# Patient Record
Sex: FEMALE | Race: WHITE | NOT HISPANIC OR LATINO | Employment: PART TIME | URBAN - METROPOLITAN AREA
[De-identification: names, ages, dates, MRNs, and addresses within clinical notes are randomized per-mention and may not be internally consistent; named-entity substitution may affect disease eponyms.]

---

## 2021-05-31 ENCOUNTER — RECORDS - HEALTHEAST (OUTPATIENT)
Dept: ADMINISTRATIVE | Facility: CLINIC | Age: 31
End: 2021-05-31

## 2022-02-07 ENCOUNTER — OFFICE VISIT (OUTPATIENT)
Dept: URGENT CARE | Facility: URGENT CARE | Age: 32
End: 2022-02-07

## 2022-02-07 VITALS
TEMPERATURE: 99 F | HEART RATE: 81 BPM | SYSTOLIC BLOOD PRESSURE: 137 MMHG | OXYGEN SATURATION: 99 % | DIASTOLIC BLOOD PRESSURE: 69 MMHG

## 2022-02-07 DIAGNOSIS — N30.00 ACUTE CYSTITIS WITHOUT HEMATURIA: Primary | ICD-10-CM

## 2022-02-07 DIAGNOSIS — N89.8 VAGINAL ITCHING: ICD-10-CM

## 2022-02-07 LAB
ALBUMIN UR-MCNC: NEGATIVE MG/DL
AMORPH CRY #/AREA URNS HPF: ABNORMAL /HPF
APPEARANCE UR: ABNORMAL
BACTERIA #/AREA URNS HPF: ABNORMAL /HPF
BILIRUB UR QL STRIP: NEGATIVE
CLUE CELLS: ABNORMAL
COLOR UR AUTO: YELLOW
GLUCOSE UR STRIP-MCNC: NEGATIVE MG/DL
HCG UR QL: NEGATIVE
HGB UR QL STRIP: NEGATIVE
KETONES UR STRIP-MCNC: NEGATIVE MG/DL
LEUKOCYTE ESTERASE UR QL STRIP: ABNORMAL
NITRATE UR QL: NEGATIVE
PH UR STRIP: 7 [PH] (ref 5–7)
RBC #/AREA URNS AUTO: ABNORMAL /HPF
SP GR UR STRIP: 1.02 (ref 1–1.03)
SQUAMOUS #/AREA URNS AUTO: ABNORMAL /LPF
TRICHOMONAS, WET PREP: ABNORMAL
UROBILINOGEN UR STRIP-ACNC: 0.2 E.U./DL
WBC #/AREA URNS AUTO: ABNORMAL /HPF
WBC'S/HIGH POWER FIELD, WET PREP: ABNORMAL
YEAST, WET PREP: ABNORMAL

## 2022-02-07 PROCEDURE — 87086 URINE CULTURE/COLONY COUNT: CPT | Performed by: NURSE PRACTITIONER

## 2022-02-07 PROCEDURE — 87491 CHLMYD TRACH DNA AMP PROBE: CPT | Performed by: NURSE PRACTITIONER

## 2022-02-07 PROCEDURE — 81001 URINALYSIS AUTO W/SCOPE: CPT | Performed by: NURSE PRACTITIONER

## 2022-02-07 PROCEDURE — 99203 OFFICE O/P NEW LOW 30 MIN: CPT | Performed by: NURSE PRACTITIONER

## 2022-02-07 PROCEDURE — 81025 URINE PREGNANCY TEST: CPT | Performed by: NURSE PRACTITIONER

## 2022-02-07 PROCEDURE — 87210 SMEAR WET MOUNT SALINE/INK: CPT | Performed by: NURSE PRACTITIONER

## 2022-02-07 PROCEDURE — 87591 N.GONORRHOEAE DNA AMP PROB: CPT | Performed by: NURSE PRACTITIONER

## 2022-02-07 RX ORDER — NITROFURANTOIN 25; 75 MG/1; MG/1
100 CAPSULE ORAL 2 TIMES DAILY
Qty: 10 CAPSULE | Refills: 0 | Status: SHIPPED | OUTPATIENT
Start: 2022-02-07 | End: 2022-02-12

## 2022-02-07 NOTE — PROGRESS NOTES
SUBJECTIVE:   Libra Guillen is a 31 year old female who  presents today for a possible UTI. Symptoms of dysuria, urgency and frequency have been going on for 1day(s).  Hematuria no.  sudden onsetand mild.  There is no history of fever, chills, nausea or vomiting.  No history of vaginal or penile discharge. This patient does not have a history of recent urinary tract infections. Patient denies long duration, rigors, flank pain, temperature > 101 degrees F. and Vomiting, significant nausea or diarrhea or vaginal discharge, vaginal odor and dyspareunia (pain in labia/pelvis)   Some itching and redness where she shaved    No past medical history on file.  Current Outpatient Medications   Medication Sig Dispense Refill     levonorgestrel (KYLEENA) 19.5 MG IUD 1 Device by Intrauterine route       Social History     Tobacco Use     Smoking status: Not on file     Smokeless tobacco: Not on file   Substance Use Topics     Alcohol use: Not on file       ROS:   Review of systems negative except as stated above.    OBJECTIVE:  /69   Pulse 81   Temp 99  F (37.2  C)   LMP  (LMP Unknown)   SpO2 99%   GENERAL APPEARANCE: alert and no distress  RESP: lungs clear to auscultation - no rales, rhonchi or wheezes  CV: regular rates and rhythm, normal S1 S2, no murmur noted  ABDOMEN:  soft, nontender, no HSM or masses and bowel sounds normal  BACK: No CVA tenderness  SKIN: no suspicious lesions or rashes  : Normal vagina, redness from shaving and small follicular lesion that is tender     ASSESSMENT:   Lower, uncomplicated urinary tract infection.    PLAN:  Wet prep negative  HCG negative  UA shows uti  STD testing pending  Drink plenty of fluids.  Prevention and treatment of UTI's discussed.Signs and symptoms of pyelonephritis mentioned.  Follow up with primary care physician if not improving      Sharron Smalls, RAISA CNP

## 2022-02-08 ENCOUNTER — TELEPHONE (OUTPATIENT)
Dept: URGENT CARE | Facility: URGENT CARE | Age: 32
End: 2022-02-08

## 2022-02-08 DIAGNOSIS — A74.9 CHLAMYDIA: Primary | ICD-10-CM

## 2022-02-08 LAB
BACTERIA UR CULT: NORMAL
C TRACH DNA SPEC QL NAA+PROBE: POSITIVE
N GONORRHOEA DNA SPEC QL NAA+PROBE: NEGATIVE

## 2022-02-08 RX ORDER — DOXYCYCLINE HYCLATE 100 MG
100 TABLET ORAL 2 TIMES DAILY
Qty: 14 TABLET | Refills: 0 | Status: SHIPPED | OUTPATIENT
Start: 2022-02-08 | End: 2022-02-15

## 2022-02-08 NOTE — TELEPHONE ENCOUNTER
Notified patient of chlamydia results. Rx sent to pharmacy for doxycycline twice daily for 7 days. Advised to have partners tested and treated, consistent condom use, and no intercourse for 3 weeks and until partner(s) treated. Questions answered. Please notify [facility].

## 2022-02-09 ENCOUNTER — TELEPHONE (OUTPATIENT)
Dept: URGENT CARE | Facility: URGENT CARE | Age: 32
End: 2022-02-09

## 2022-02-09 NOTE — TELEPHONE ENCOUNTER
Left message for patient to call regarding lab results from 2/7/22.  Upon further review, it looks like this was already addressed by Rosanne Watts NP yesterday.  She may disregard this message.    Graciela Gallagher PA-C

## 2023-05-04 ENCOUNTER — APPOINTMENT (OUTPATIENT)
Dept: GENERAL RADIOLOGY | Facility: CLINIC | Age: 33
End: 2023-05-04
Attending: EMERGENCY MEDICINE
Payer: COMMERCIAL

## 2023-05-04 ENCOUNTER — HOSPITAL ENCOUNTER (EMERGENCY)
Facility: CLINIC | Age: 33
Discharge: HOME | End: 2023-05-04
Attending: EMERGENCY MEDICINE | Admitting: EMERGENCY MEDICINE
Payer: COMMERCIAL

## 2023-05-04 VITALS
SYSTOLIC BLOOD PRESSURE: 112 MMHG | HEART RATE: 75 BPM | TEMPERATURE: 98 F | DIASTOLIC BLOOD PRESSURE: 97 MMHG | RESPIRATION RATE: 18 BRPM | OXYGEN SATURATION: 97 %
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
## 2023-05-04 DIAGNOSIS — S42.025K CLOSED NONDISPLACED FRACTURE OF SHAFT OF LEFT CLAVICLE WITH NONUNION, SUBSEQUENT ENCOUNTER: ICD-10-CM

## 2023-05-04 DIAGNOSIS — R42 DIZZINESS: ICD-10-CM

## 2023-05-04 DIAGNOSIS — I49.1 PREMATURE ATRIAL CONTRACTIONS: ICD-10-CM

## 2023-05-04 LAB
ALBUMIN SERPL BCG-MCNC: 4.5 G/DL (ref 3.5–5.2)
ALBUMIN UR-MCNC: NEGATIVE MG/DL
ALP SERPL-CCNC: 54 U/L (ref 35–104)
ALT SERPL W P-5'-P-CCNC: 13 U/L (ref 10–35)
ANION GAP SERPL CALCULATED.3IONS-SCNC: 9 MMOL/L (ref 7–15)
APPEARANCE UR: CLEAR
AST SERPL W P-5'-P-CCNC: 16 U/L (ref 10–35)
BACTERIA #/AREA URNS HPF: ABNORMAL /HPF
BASOPHILS # BLD AUTO: 0.1 10E3/UL (ref 0–0.2)
BASOPHILS NFR BLD AUTO: 1 %
BILIRUB SERPL-MCNC: 0.4 MG/DL
BILIRUB UR QL STRIP: NEGATIVE
BUN SERPL-MCNC: 13 MG/DL (ref 6–20)
CALCIUM SERPL-MCNC: 9.6 MG/DL (ref 8.6–10)
CHLORIDE SERPL-SCNC: 103 MMOL/L (ref 98–107)
COLOR UR AUTO: YELLOW
CREAT SERPL-MCNC: 0.75 MG/DL (ref 0.51–0.95)
DEPRECATED HCO3 PLAS-SCNC: 24 MMOL/L (ref 22–29)
EOSINOPHIL # BLD AUTO: 0.1 10E3/UL (ref 0–0.7)
EOSINOPHIL NFR BLD AUTO: 1 %
ERYTHROCYTE [DISTWIDTH] IN BLOOD BY AUTOMATED COUNT: 13.2 % (ref 10–15)
GFR SERPL CREATININE-BSD FRML MDRD: >90 ML/MIN/1.73M2
GLUCOSE SERPL-MCNC: 96 MG/DL (ref 70–99)
GLUCOSE UR STRIP-MCNC: NEGATIVE MG/DL
HCG UR QL: NEGATIVE
HCT VFR BLD AUTO: 42.6 % (ref 35–47)
HGB BLD-MCNC: 14.3 G/DL (ref 11.7–15.7)
HGB UR QL STRIP: NEGATIVE
IMM GRANULOCYTES # BLD: 0 10E3/UL
IMM GRANULOCYTES NFR BLD: 0 %
KETONES UR STRIP-MCNC: NEGATIVE MG/DL
LEUKOCYTE ESTERASE UR QL STRIP: NEGATIVE
LYMPHOCYTES # BLD AUTO: 3.5 10E3/UL (ref 0.8–5.3)
LYMPHOCYTES NFR BLD AUTO: 33 %
MCH RBC QN AUTO: 29.1 PG (ref 26.5–33)
MCHC RBC AUTO-ENTMCNC: 33.6 G/DL (ref 31.5–36.5)
MCV RBC AUTO: 87 FL (ref 78–100)
MONOCYTES # BLD AUTO: 0.7 10E3/UL (ref 0–1.3)
MONOCYTES NFR BLD AUTO: 6 %
MUCOUS THREADS #/AREA URNS LPF: PRESENT /LPF
NEUTROPHILS # BLD AUTO: 6.2 10E3/UL (ref 1.6–8.3)
NEUTROPHILS NFR BLD AUTO: 59 %
NITRATE UR QL: NEGATIVE
NRBC # BLD AUTO: 0 10E3/UL
NRBC BLD AUTO-RTO: 0 /100
PH UR STRIP: 5 [PH] (ref 5–7)
PLATELET # BLD AUTO: 417 10E3/UL (ref 150–450)
POTASSIUM SERPL-SCNC: 4.4 MMOL/L (ref 3.4–5.3)
PROT SERPL-MCNC: 7.4 G/DL (ref 6.4–8.3)
RBC # BLD AUTO: 4.92 10E6/UL (ref 3.8–5.2)
RBC URINE: 0 /HPF
SODIUM SERPL-SCNC: 136 MMOL/L (ref 136–145)
SP GR UR STRIP: 1.02 (ref 1–1.03)
SQUAMOUS EPITHELIAL: 5 /HPF
UROBILINOGEN UR STRIP-MCNC: NORMAL MG/DL
WBC # BLD AUTO: 10.5 10E3/UL (ref 4–11)
WBC URINE: 0 /HPF

## 2023-05-04 PROCEDURE — 99284 EMERGENCY DEPT VISIT MOD MDM: CPT | Mod: 25 | Performed by: EMERGENCY MEDICINE

## 2023-05-04 PROCEDURE — 99285 EMERGENCY DEPT VISIT HI MDM: CPT | Mod: 25 | Performed by: EMERGENCY MEDICINE

## 2023-05-04 PROCEDURE — 85025 COMPLETE CBC W/AUTO DIFF WBC: CPT | Performed by: EMERGENCY MEDICINE

## 2023-05-04 PROCEDURE — 36415 COLL VENOUS BLD VENIPUNCTURE: CPT | Performed by: EMERGENCY MEDICINE

## 2023-05-04 PROCEDURE — 250N000013 HC RX MED GY IP 250 OP 250 PS 637: Performed by: EMERGENCY MEDICINE

## 2023-05-04 PROCEDURE — 81025 URINE PREGNANCY TEST: CPT | Performed by: EMERGENCY MEDICINE

## 2023-05-04 PROCEDURE — 250N000011 HC RX IP 250 OP 636: Performed by: EMERGENCY MEDICINE

## 2023-05-04 PROCEDURE — 80053 COMPREHEN METABOLIC PANEL: CPT | Performed by: EMERGENCY MEDICINE

## 2023-05-04 PROCEDURE — 93005 ELECTROCARDIOGRAM TRACING: CPT | Performed by: EMERGENCY MEDICINE

## 2023-05-04 PROCEDURE — 73030 X-RAY EXAM OF SHOULDER: CPT | Mod: LT

## 2023-05-04 PROCEDURE — 93010 ELECTROCARDIOGRAM REPORT: CPT | Performed by: EMERGENCY MEDICINE

## 2023-05-04 PROCEDURE — 258N000003 HC RX IP 258 OP 636: Performed by: EMERGENCY MEDICINE

## 2023-05-04 PROCEDURE — 96361 HYDRATE IV INFUSION ADD-ON: CPT | Performed by: EMERGENCY MEDICINE

## 2023-05-04 PROCEDURE — 81001 URINALYSIS AUTO W/SCOPE: CPT | Performed by: EMERGENCY MEDICINE

## 2023-05-04 PROCEDURE — 96374 THER/PROPH/DIAG INJ IV PUSH: CPT | Performed by: EMERGENCY MEDICINE

## 2023-05-04 RX ORDER — HYDROCODONE BITARTRATE AND ACETAMINOPHEN 5; 325 MG/1; MG/1
1 TABLET ORAL ONCE
Status: COMPLETED | OUTPATIENT
Start: 2023-05-04 | End: 2023-05-04

## 2023-05-04 RX ORDER — ONDANSETRON 2 MG/ML
4 INJECTION INTRAMUSCULAR; INTRAVENOUS EVERY 30 MIN PRN
Status: DISCONTINUED | OUTPATIENT
Start: 2023-05-04 | End: 2023-05-04 | Stop reason: HOSPADM

## 2023-05-04 RX ORDER — ONDANSETRON 4 MG/1
4 TABLET, ORALLY DISINTEGRATING ORAL EVERY 6 HOURS PRN
Qty: 10 TABLET | Refills: 0 | Status: SHIPPED | OUTPATIENT
Start: 2023-05-04 | End: 2023-05-07

## 2023-05-04 RX ORDER — HYDROCODONE BITARTRATE AND ACETAMINOPHEN 5; 325 MG/1; MG/1
1 TABLET ORAL EVERY 6 HOURS PRN
Qty: 6 TABLET | Refills: 0 | Status: SHIPPED | OUTPATIENT
Start: 2023-05-04

## 2023-05-04 RX ORDER — SODIUM CHLORIDE 9 MG/ML
INJECTION, SOLUTION INTRAVENOUS CONTINUOUS
Status: DISCONTINUED | OUTPATIENT
Start: 2023-05-04 | End: 2023-05-04 | Stop reason: HOSPADM

## 2023-05-04 RX ADMIN — HYDROCODONE BITARTRATE AND ACETAMINOPHEN 1 TABLET: 5; 325 TABLET ORAL at 16:07

## 2023-05-04 RX ADMIN — SODIUM CHLORIDE 1000 ML: 9 INJECTION, SOLUTION INTRAVENOUS at 16:04

## 2023-05-04 RX ADMIN — ONDANSETRON 4 MG: 2 INJECTION INTRAMUSCULAR; INTRAVENOUS at 16:07

## 2023-05-04 ASSESSMENT — ACTIVITIES OF DAILY LIVING (ADL): ADLS_ACUITY_SCORE: 35

## 2023-05-04 NOTE — DISCHARGE INSTRUCTIONS
Your EKG showed extra beats occasionally, but no sign of strain or stress on your heart.  With otherwise normal blood work, suspect that these extra beats may be causing some of your dizziness and symptoms.  An order for a wearable cardiac monitor has been placed, and you can have this put on in the cardiology department here at [facility].  Contact their department for scheduling    You have evidence of an old clavicle fracture that has not healed, especially since they had to remove the plates and screws.  However, does not appear to be displaced, no new injury is appreciated, and no evidence of puncturing lung or skin is appreciated.  Your shoulder was otherwise normal    You may wear the sling that you have at home for comfort for the next 1 to 2 days to support your clavicle and shoulder.  However, you can remove as needed for bathing/showering or sleeping if needed    May take 1 tablet of the Norco every 6 hours as needed for severe pain.  Use caution as this medication can make you drowsy, so do not drive or drink alcohol if taking this medicine    You may use the Zofran every 6 hours as needed for nausea or vomiting    A referral was made to help you get established with a primary care provider.  Somebody should be contacting you to establish care with a clinic provider, and this person can follow-up on any ongoing needs to have, follow-up on cardiac monitor, and any additional health concerns    If you develop any new or worsening symptoms, do not hesitate to return to the emergency room for evaluation

## 2023-05-04 NOTE — ED PROVIDER NOTES
History     Chief Complaint   Patient presents with     Shoulder Pain     HPI  Libra Guillen is a 33 year old female who presents to the emergency room with concern of nausea, dizziness, and fall.  She has been feeling dizzy for the last few days and quite nauseated.  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  She was at the bottom of a set of stairs when she was extremely dizzy and fell over.  Fell onto her left shoulder and collarbone.  Has a history of left collarbone fracture that required plates and screws, then hardware became infected and needed to be removed and needed to be hospitalized.  This occurred 2 years ago.  She fell in the same spot that she had injured before and is having pain.  Is not having any numbness or tingling down her fingers.  Did not hit her head in this fall.    Allergies:  Allergies   Allergen Reactions     Penicillins Rash     Amoxicillin Unknown     Penicillins Unknown     Latex Rash     In bandaid form causes rash if left in place over 24hrs. Pt does not think this is adhesive related but has not been tested.       Problem List:    There are no problems to display for this patient.       Past Medical History:    History reviewed. No pertinent past medical history.    Past Surgical History:    History reviewed. No pertinent surgical history.    Family History:    No family history on file.    Social History:  Marital Status:   [4]  Social History     Tobacco Use     Smoking status: Never     Smokeless tobacco: Never   Substance Use Topics     Alcohol use: Yes     Drug use: Never        Medications:    levonorgestrel (KYLEENA) 19.5 MG IUD          Review of Systems   All other systems reviewed and are negative.      Physical Exam   BP: 133/88  Pulse: 73  Temp: 98  F (36.7  C)  Resp: 18  SpO2: 99 %      Physical Exam  Vitals and nursing note reviewed.   Constitutional:        Appearance: She is not diaphoretic.   HENT:      Head: Normocephalic and atraumatic.      Right Ear: Tympanic membrane normal.      Left Ear: Tympanic membrane normal.   Eyes:      Extraocular Movements: Extraocular movements intact.      Pupils: Pupils are equal, round, and reactive to light.   Cardiovascular:      Rate and Rhythm: Normal rate and regular rhythm.      Pulses: Normal pulses.   Pulmonary:      Effort: Pulmonary effort is normal.   Musculoskeletal:      Cervical back: Normal range of motion.      Comments: Tenderness over mid and distal left clavicular shaft, no obvious deformity or apparent fracture, no skin tenting, no overlying redness or bruising.  Scar from previous surgery is well-healed   Skin:     General: Skin is warm and dry.      Findings: No bruising.   Neurological:      Mental Status: She is alert.         ED Course                 Procedures              EKG Interpretation:      Interpreted by Patsy Solomon DO  Time reviewed: 1600  Symptoms at time of EKG: Dizziness, nausea, left shoulder pain, fall  Rhythm: normal sinus   Rate: Normal  Axis: Normal  Ectopy: premature atrial contraction  Conduction: normal  ST Segments/ T Waves: No acute ischemic changes  Q Waves: none  Comparison to prior: No old EKG available    Clinical Impression: no acute changes and non-specific EKG                Critical Care time:  none               Results for orders placed or performed during the hospital encounter of 05/04/23 (from the past 24 hour(s))   CBC with platelets differential    Narrative    The following orders were created for panel order CBC with platelets differential.  Procedure                               Abnormality         Status                     ---------                               -----------         ------                     CBC with platelets and d...[[id number]]                      Final result                 Please view results for these tests on the individual  orders.   Comprehensive metabolic panel   Result Value Ref Range    Sodium 136 136 - 145 mmol/L    Potassium 4.4 3.4 - 5.3 mmol/L    Chloride 103 98 - 107 mmol/L    Carbon Dioxide (CO2) 24 22 - 29 mmol/L    Anion Gap 9 7 - 15 mmol/L    Urea Nitrogen 13.0 6.0 - 20.0 mg/dL    Creatinine 0.75 0.51 - 0.95 mg/dL    Calcium 9.6 8.6 - 10.0 mg/dL    Glucose 96 70 - 99 mg/dL    Alkaline Phosphatase 54 35 - 104 U/L    AST 16 10 - 35 U/L    ALT 13 10 - 35 U/L    Protein Total 7.4 6.4 - 8.3 g/dL    Albumin 4.5 3.5 - 5.2 g/dL    Bilirubin Total 0.4 <=1.2 mg/dL    GFR Estimate >90 >60 mL/min/1.73m2   CBC with platelets and differential   Result Value Ref Range    WBC Count 10.5 4.0 - 11.0 10e3/uL    RBC Count 4.92 3.80 - 5.20 10e6/uL    Hemoglobin 14.3 11.7 - 15.7 g/dL    Hematocrit 42.6 35.0 - 47.0 %    MCV 87 78 - 100 fL    MCH 29.1 26.5 - 33.0 pg    MCHC 33.6 31.5 - 36.5 g/dL    RDW 13.2 10.0 - 15.0 %    Platelet Count 417 150 - 450 10e3/uL    % Neutrophils 59 %    % Lymphocytes 33 %    % Monocytes 6 %    % Eosinophils 1 %    % Basophils 1 %    % Immature Granulocytes 0 %    NRBCs per 100 WBC 0 <1 /100    Absolute Neutrophils 6.2 1.6 - 8.3 10e3/uL    Absolute Lymphocytes 3.5 0.8 - 5.3 10e3/uL    Absolute Monocytes 0.7 0.0 - 1.3 10e3/uL    Absolute Eosinophils 0.1 0.0 - 0.7 10e3/uL    Absolute Basophils 0.1 0.0 - 0.2 10e3/uL    Absolute Immature Granulocytes 0.0 <=0.4 10e3/uL    Absolute NRBCs 0.0 10e3/uL   XR Shoulder Left G/E 3 Views    Narrative    XR SHOULDER LEFT G/E 3 VIEWS  5/4/2023 4:14 PM     HISTORY: Fall, shoulder and clavicle injury, hx clavicle fracture with  hardware infection  COMPARISON: None      Impression    IMPRESSION: 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 No acute fracture or malalignment. No significant  degenerative changes.    ANAHI BUCHANAN MD         SYSTEM ID:  [id number]   UA with Microscopic reflex to Culture    Specimen: Urine, Midstream   Result Value Ref Range     Color Urine Yellow Colorless, Straw, Light Yellow, Yellow    Appearance Urine Clear Clear    Glucose Urine Negative Negative mg/dL    Bilirubin Urine Negative Negative    Ketones Urine Negative Negative mg/dL    Specific Gravity Urine 1.019 1.003 - 1.035    Blood Urine Negative Negative    pH Urine 5.0 5.0 - 7.0    Protein Albumin Urine Negative Negative mg/dL    Urobilinogen Urine Normal Normal, 2.0 mg/dL    Nitrite Urine Negative Negative    Leukocyte Esterase Urine Negative Negative    Bacteria Urine Few (A) None Seen /HPF    Mucus Urine Present (A) None Seen /LPF    RBC Urine 0 <=2 /HPF    WBC Urine 0 <=5 /HPF    Squamous Epithelials Urine 5 (H) <=1 /HPF    Narrative    Urine Culture not indicated   HCG qualitative urine   Result Value Ref Range    hCG Urine Qualitative Negative Negative       Medications   0.9% sodium chloride BOLUS (1,000 mLs Intravenous $New Bag 5/4/23 1605)     Followed by   sodium chloride 0.9% infusion (has no administration in time range)   ondansetron (ZOFRAN) injection 4 mg (4 mg Intravenous $Given 5/4/23 1601)   HYDROcodone-acetaminophen (NORCO) 5-325 MG per tablet 1 tablet (1 tablet Oral $Given 5/4/23 1605)       Assessments & Plan (with Medical Decision Making)  Libra is a 33-year-old female presenting to the emergency room with nausea, dizziness, and fall.  See history and focused physical exam as above  Pleasant 33-year-old female in no acute distress, is vitally stable and afebrile.  Does not have any skin tenting or sign of obvious clavicle fracture.  Strong radial pulse and normal cap refill on the left side.  No injury at elbow or wrist.  Has tenderness with palpation over the area of concern.  Lungs are clear otherwise to auscultation.  No acute findings on the remainder physical exam.  We will get an EKG, lab work, and x-ray.  We will also give her a dose of pain medication and something for nausea  Labs and imaging as above.  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  EKG showed normal sinus rhythm with frequent PACs, but no ST elevation, no conduction abnormality.  This may be accounting for some of her symptoms.  She does feel improved with the IV fluids, Norco, and Zofran given in the ED.  Was updated on normal findings thus far.  Has a sling at home that she may wear for comfort over the next 24 to 48 hours.  We will also give a few tablets of Norco to help with acute severe pain to use sparingly.  Given prescription of Zofran for nausea.  Since she does not have a primary provider, and has not previously been told that she has PACs, will order for Zio patch and primary care referral to get her established and follow-up.  She was provided with a work note.  Instructed to return to the ED if symptoms worsen.  All questions were answered and discharged in no distress       I have reviewed the nursing notes.    I have reviewed the findings, diagnosis, plan and need for follow up with the patient.           Medical Decision Making  The patient's presentation was of low complexity (an acute and uncomplicated illness or injury).    The patient's evaluation involved:  ordering and/or review of 3+ test(s) in this encounter (see separate area of note for details)    The patient's management necessitated moderate risk (prescription drug management including medications given in the ED).        New Prescriptions    No medications on file       Final diagnoses:   Premature atrial contractions   Dizziness   Fall down stairs, initial encounter   Closed nondisplaced fracture of shaft of left clavicle with nonunion, subsequent encounter       5/4/2023   [facility] EMERGENCY DEPT     Patsy Solomon DO  05/04/23 9878

## 2023-05-04 NOTE — ED TRIAGE NOTES
C/o nausea and dizziness last couple days - today stayed home from work and fall to L shoulder and collarbone/shoulder pain. Hx broken collarbone per pt.      Triage Assessment     Row Name 05/04/23 1521       Triage Assessment (Adult)    Airway WDL WDL       Respiratory WDL    Respiratory WDL WDL       Cardiac WDL    Cardiac WDL WDL

## 2023-05-04 NOTE — Clinical Note
Libra Guillen was seen and treated in our emergency department on 5/4/2023.  She may return to work on 05/05/2023.       If you have any questions or concerns, please don't hesitate to call.      Patsy Solomon, DO

## 2023-05-12 ENCOUNTER — HOSPITAL ENCOUNTER (OUTPATIENT)
Dept: CARDIOLOGY | Facility: CLINIC | Age: 33
Discharge: HOME | End: 2023-05-12
Attending: EMERGENCY MEDICINE | Admitting: EMERGENCY MEDICINE
Payer: COMMERCIAL

## 2023-05-12 DIAGNOSIS — R42 DIZZINESS: ICD-10-CM

## 2023-05-12 DIAGNOSIS — I49.1 PREMATURE ATRIAL CONTRACTIONS: ICD-10-CM

## 2023-05-12 PROCEDURE — 93242 EXT ECG>48HR<7D RECORDING: CPT

## 2023-06-07 ENCOUNTER — VIRTUAL VISIT (OUTPATIENT)
Dept: FAMILY MEDICINE | Facility: CLINIC | Age: 33
End: 2023-06-07
Payer: COMMERCIAL

## 2023-06-07 DIAGNOSIS — I49.1 PAC (PREMATURE ATRIAL CONTRACTION): Primary | ICD-10-CM

## 2023-06-07 PROCEDURE — 99213 OFFICE O/P EST LOW 20 MIN: CPT | Mod: VID | Performed by: STUDENT IN AN ORGANIZED HEALTH CARE EDUCATION/TRAINING PROGRAM

## 2023-06-07 NOTE — PROGRESS NOTES
Libra is a 33 year old who is being evaluated via a billable video visit.      How would you like to obtain your AVS? MyChart  If the video visit is dropped, the invitation should be resent by: Text to cell phone: [phone]  Will anyone else be joining your video visit? No      Patient completed E-check in. Questionnaires were blown in and Patient checked in without being called. Any additional information will need to be completed by the provider.    Assessment & Plan     PAC (premature atrial contraction)  Patient with frequent PACs noted on Holter monitor. Was associated with one of her triggering events of palpitation but happening greater than 7%.  On the regimen normal sinus rhythm.  Patient otherwise feeling well since then with no recurrent episodes.  Given frequency we did discuss obtaining echo to rule out any structural component.  Also discussed treatment options if she was symptomatic with palpitations in the future but they do not sound bothersome at this time.  Plan to obtain echo now.  Could consider cardiology follow-up in the future.  - Echocardiogram Complete        Pablo Craig MD  [facility]   Libra is a 33 year old, presenting for the following health issues:  Follow Up (Heart monitor results)        6/7/2023     7:21 AM   Additional Questions   Roomed by Babak SHAH   Accompanied by Self         6/7/2023     7:21 AM   Patient Reported Additional Medications   Patient reports taking the following new medications None     History of Present Illness       Vascular Disease:  She presents for follow up of vascular disease.  She never takes nitroglycerin. She is not taking daily aspirin.    She eats 2-3 servings of fruits and vegetables daily.She consumes 2 sweetened beverage(s) daily.She exercises with enough effort to increase her heart rate 60 or more minutes per day.  She exercises with enough effort to increase her heart rate 7 days per week.   She  is taking medications regularly.     Patient with no further lightheaded episodes since event in the ER with otherwise normal work-up.  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  No other chest pain or respiratory issues.  No family history of heart disease.  Sometimes notes palpitations.      Review of Systems   Constitutional, HEENT, cardiovascular, pulmonary, gi and gu systems are negative, except as otherwise noted.      Objective           Vitals:  No vitals were obtained today due to virtual visit.    Physical Exam   GENERAL: Healthy, alert and no distress  EYES: Eyes grossly normal to inspection.  No discharge or erythema, or obvious scleral/conjunctival abnormalities.  RESP: No audible wheeze, cough, or visible cyanosis.  No visible retractions or increased work of breathing.    SKIN: Visible skin clear. No significant rash, abnormal pigmentation or lesions.  NEURO: Cranial nerves grossly intact.  Mentation and speech appropriate for age.  PSYCH: Mentation appears normal, affect normal/bright, judgement and insight intact, normal speech and appearance well-groomed.           Video-Visit Details    Type of service:  Video Visit     Originating Location (pt. Location): Home    Distant Location (provider location):  On-site  Platform used for Video Visit: Chatty

## 2023-07-20 ENCOUNTER — HOSPITAL ENCOUNTER (OUTPATIENT)
Dept: CARDIOLOGY | Facility: CLINIC | Age: 33
Discharge: HOME | End: 2023-07-20
Attending: STUDENT IN AN ORGANIZED HEALTH CARE EDUCATION/TRAINING PROGRAM | Admitting: STUDENT IN AN ORGANIZED HEALTH CARE EDUCATION/TRAINING PROGRAM
Payer: COMMERCIAL

## 2023-07-20 DIAGNOSIS — I49.1 PAC (PREMATURE ATRIAL CONTRACTION): ICD-10-CM

## 2023-07-20 LAB — LVEF ECHO: NORMAL

## 2023-07-20 PROCEDURE — 93306 TTE W/DOPPLER COMPLETE: CPT

## 2023-07-20 PROCEDURE — 93306 TTE W/DOPPLER COMPLETE: CPT | Mod: 26 | Performed by: INTERNAL MEDICINE

## 2023-07-22 ENCOUNTER — HEALTH MAINTENANCE LETTER (OUTPATIENT)
Age: 33
End: 2023-07-22

## 2024-05-12 ENCOUNTER — APPOINTMENT (OUTPATIENT)
Dept: CT IMAGING | Facility: CLINIC | Age: 34
End: 2024-05-12
Attending: EMERGENCY MEDICINE
Payer: COMMERCIAL

## 2024-05-12 ENCOUNTER — HOSPITAL ENCOUNTER (EMERGENCY)
Facility: CLINIC | Age: 34
Discharge: HOME | End: 2024-05-13
Attending: EMERGENCY MEDICINE | Admitting: EMERGENCY MEDICINE
Payer: COMMERCIAL

## 2024-05-12 VITALS
SYSTOLIC BLOOD PRESSURE: 146 MMHG | RESPIRATION RATE: 16 BRPM | OXYGEN SATURATION: 100 % | DIASTOLIC BLOOD PRESSURE: 83 MMHG | TEMPERATURE: 97.7 F | HEART RATE: 86 BPM

## 2024-05-12 DIAGNOSIS — S06.0XAA CONCUSSION WITH UNKNOWN LOSS OF CONSCIOUSNESS STATUS, INITIAL ENCOUNTER: ICD-10-CM

## 2024-05-12 PROCEDURE — 99284 EMERGENCY DEPT VISIT MOD MDM: CPT | Mod: 25

## 2024-05-12 PROCEDURE — 250N000013 HC RX MED GY IP 250 OP 250 PS 637: Performed by: EMERGENCY MEDICINE

## 2024-05-12 PROCEDURE — 70486 CT MAXILLOFACIAL W/O DYE: CPT

## 2024-05-12 PROCEDURE — 99284 EMERGENCY DEPT VISIT MOD MDM: CPT | Performed by: EMERGENCY MEDICINE

## 2024-05-12 PROCEDURE — 70450 CT HEAD/BRAIN W/O DYE: CPT

## 2024-05-12 RX ORDER — ACETAMINOPHEN 500 MG
1000 TABLET ORAL ONCE
Status: COMPLETED | OUTPATIENT
Start: 2024-05-12 | End: 2024-05-12

## 2024-05-12 RX ORDER — OXYCODONE HYDROCHLORIDE 5 MG/1
5 TABLET ORAL ONCE
Status: COMPLETED | OUTPATIENT
Start: 2024-05-12 | End: 2024-05-12

## 2024-05-12 RX ADMIN — ACETAMINOPHEN 1000 MG: 500 TABLET ORAL at 22:45

## 2024-05-12 RX ADMIN — OXYCODONE HYDROCHLORIDE 5 MG: 5 TABLET ORAL at 22:45

## 2024-05-12 ASSESSMENT — COLUMBIA-SUICIDE SEVERITY RATING SCALE - C-SSRS
1. IN THE PAST MONTH, HAVE YOU WISHED YOU WERE DEAD OR WISHED YOU COULD GO TO SLEEP AND NOT WAKE UP?: NO
2. HAVE YOU ACTUALLY HAD ANY THOUGHTS OF KILLING YOURSELF IN THE PAST MONTH?: NO
6. HAVE YOU EVER DONE ANYTHING, STARTED TO DO ANYTHING, OR PREPARED TO DO ANYTHING TO END YOUR LIFE?: NO

## 2024-05-12 ASSESSMENT — ACTIVITIES OF DAILY LIVING (ADL): ADLS_ACUITY_SCORE: 33

## 2024-05-12 NOTE — Clinical Note
Libra Guillen was seen and treated in our emergency department on 5/12/2024.  She may return to work on 05/14/2024.       If you have any questions or concerns, please don't hesitate to call.      Ousmane Johansen MD

## 2024-05-13 NOTE — ED TRIAGE NOTES
Pt fell on Friday 5/10 hitting her head on a rock. Laceration to forehead. Pt was seen in [place]. Sutures to lac. Pt reports increased facial pressure, dizziness and confusion today. No imaging completed during visit.      Triage Assessment (Adult)       Row Name 05/12/24 6184          Triage Assessment    Airway WDL WDL        Respiratory WDL    Respiratory WDL WDL        Skin Circulation/Temperature WDL    Skin Circulation/Temperature WDL X  Laceration, repaired on Friday 5/10        Peripheral/Neurovascular WDL    Peripheral Neurovascular WDL WDL        Cognitive/Neuro/Behavioral WDL    Cognitive/Neuro/Behavioral WDL X  Confusion, pressure

## 2024-05-13 NOTE — ED PROVIDER NOTES
History     chief complaint  HPI  Patient is a 34-year-old otherwise healthy female presenting with a chief complaint of confusion, worsening headache, dizziness.  2 days ago she fell in the woods and hit her head on a rock.  She does not know if she lost consciousness.  She went to the ED in [place] and her facial laceration was sutured.  No imaging performed.  She states that over the last several days her swelling is worsened and she has developed dizziness and confusion.  No numbness/ting/weakness on one side of her body.    Review of Systems:  All organ systems below were reviewed and are negative unless indicated in the HPI.    Constitutional  Eyes  ENT  Respiratory  Cardiovascular  Gastrointestinal  Genitourinary  Musculoskeletal  Skin  Neuro    Allergies:  Allergies   Allergen Reactions    Penicillins Rash    Amoxicillin Unknown    Penicillins Unknown    Latex Rash     In bandaid form causes rash if left in place over 24hrs. Pt does not think this is adhesive related but has not been tested.       Problem List:    There are no problems to display for this patient.       Past Medical History:    No past medical history on file.    Past Surgical History:    No past surgical history on file.    Family History:    No family history on file.    Medications:    HYDROcodone-acetaminophen (NORCO) 5-325 MG tablet  levonorgestrel (KYLEENA) 19.5 MG IUD          Physical Exam   BP: (!) 146/83  Pulse: 86  Temp: 97.7  F (36.5  C)  Resp: 16  SpO2: 100 %    Gen: Vital signs reviewed  Eyes: Sclera white, pupils round  ENT: External ears and nares normal.  Tender palpation of the supraorbital ridge on the left as well as the nasal bone.  Slight periorbital ecchymoses on the left with generalized swelling around the laceration.  Card: Regular rate and rhythm  Resp: No respiratory distress. Lungs clear to auscultation bilaterally  Abd: Soft, non-distended, non-tender  Extremities: Symmetric distal pulses.  Skin: Healing  sutured laceration  Neuro: Alert, speech normal. Face is symmetric.  Strength and sensation intact throughout.  No dysmetria.  Visual fields grossly intact.  Hearing grossly intact.  Normal gait.  Normal tandem gait.      ED Course        Procedures         Results for orders placed or performed during the hospital encounter of 05/12/24 (from the past 24 hour(s))   Head CT w/o contrast    Narrative    EXAM: CT FACIAL BONES WITHOUT CONTRAST, CT HEAD W/O CONTRAST  LOCATION: [facility]  DATE: 5/12/2024    INDICATION: Fracture of supraorbital area, direct trauma with rock. Traumatic injury.  COMPARISON: None.  TECHNIQUE:   1) Routine CT Head without IV contrast. Multiplanar reformats. Dose reduction techniques were used.  2) Routine CT Facial Bones without IV contrast. Multiplanar reformats. Dose reduction techniques were used.    FINDINGS:  HEAD CT:   INTRACRANIAL CONTENTS: No intracranial hemorrhage, extraaxial collection, or mass effect.  No CT evidence of acute infarct. Normal parenchymal density for age. The ventricles and sulci are normal for age.     OSSEOUS STRUCTURES/SOFT TISSUES: No calvarial fracture. Left inferior frontal/periorbital soft tissue contusion.     FACIAL BONE CT:  OSSEOUS STRUCTURES/SOFT TISSUES: Left inferior frontal scalp/supraorbital soft tissue swelling/contusion. No facial bone fracture or malalignment. A few scattered dental caries are noted. No periapical lucencies or dental trauma identified.    ORBITAL CONTENTS: No acute abnormality.    SINUSES: No significant paranasal sinus mucosal disease.      Impression    IMPRESSION:  HEAD CT:  1.  No acute intracranial process.    FACIAL BONE CT:  1.  No facial bone or mandibular fracture.     CT Facial Bones without Contrast    Narrative    EXAM: CT FACIAL BONES WITHOUT CONTRAST, CT HEAD W/O CONTRAST  LOCATION: [facility]  DATE: 5/12/2024    INDICATION: Fracture of supraorbital  area, direct trauma with rock. Traumatic injury.  COMPARISON: None.  TECHNIQUE:   1) Routine CT Head without IV contrast. Multiplanar reformats. Dose reduction techniques were used.  2) Routine CT Facial Bones without IV contrast. Multiplanar reformats. Dose reduction techniques were used.    FINDINGS:  HEAD CT:   INTRACRANIAL CONTENTS: No intracranial hemorrhage, extraaxial collection, or mass effect.  No CT evidence of acute infarct. Normal parenchymal density for age. The ventricles and sulci are normal for age.     OSSEOUS STRUCTURES/SOFT TISSUES: No calvarial fracture. Left inferior frontal/periorbital soft tissue contusion.     FACIAL BONE CT:  OSSEOUS STRUCTURES/SOFT TISSUES: Left inferior frontal scalp/supraorbital soft tissue swelling/contusion. No facial bone fracture or malalignment. A few scattered dental caries are noted. No periapical lucencies or dental trauma identified.    ORBITAL CONTENTS: No acute abnormality.    SINUSES: No significant paranasal sinus mucosal disease.      Impression    IMPRESSION:  HEAD CT:  1.  No acute intracranial process.    FACIAL BONE CT:  1.  No facial bone or mandibular fracture.         Medications   oxyCODONE (ROXICODONE) tablet 5 mg (5 mg Oral $Given 5/12/24 2245)   acetaminophen (TYLENOL) tablet 1,000 mg (1,000 mg Oral $Given 5/12/24 2245)         Consultations:  None    Social Determinants of Health:  Manages ADLs    Independent Review of Notes:  None  Assessments & Plan (with Medical Decision Making)       I have reviewed the nursing notes.    I have reviewed the findings, diagnosis, plan and need for follow up with the patient.      Medical Decision Making  On arrival patient slightly hypertensive.  Presentation most consistent with concussion.  However, given the mechanism and exam here I did order CT of her head and face.  No acute pathology visualized that required further emergent management.  Had a long discussion about conservative treatment measures for  concussion.  As she is having vertigo with this and this has a tendency to stick around longer, I did refer her to a concussion specialist.  Provided with a work note.  Discharged home in stable condition.    Final diagnoses:   Concussion with unknown loss of consciousness status, initial encounter         Ousmane Johansen M.D.   [facility] Emergency Department     Ousmane Johansen MD  05/13/24 0405

## 2024-05-13 NOTE — DISCHARGE INSTRUCTIONS
"Your CT scans do not show any dangerous injury.  You have a concussion which is responsible for your symptoms and the increased swelling is following the normal pathway for post injury inflammation.    I have placed a referral to the concussion specialist for you.  Remember the \"Goldilocks approach\" for healing a concussion.  "

## 2024-07-24 ENCOUNTER — OFFICE VISIT (OUTPATIENT)
Dept: PHYSICAL MEDICINE AND REHAB | Facility: CLINIC | Age: 34
End: 2024-07-24
Attending: EMERGENCY MEDICINE
Payer: COMMERCIAL

## 2024-07-24 VITALS — DIASTOLIC BLOOD PRESSURE: 80 MMHG | SYSTOLIC BLOOD PRESSURE: 125 MMHG | HEART RATE: 80 BPM

## 2024-07-24 DIAGNOSIS — S06.0XAA CONCUSSION WITH UNKNOWN LOSS OF CONSCIOUSNESS STATUS, INITIAL ENCOUNTER: Primary | ICD-10-CM

## 2024-07-24 PROCEDURE — 99205 OFFICE O/P NEW HI 60 MIN: CPT | Performed by: PHYSICAL MEDICINE & REHABILITATION

## 2024-07-24 RX ORDER — RIZATRIPTAN BENZOATE 10 MG/1
10 TABLET, ORALLY DISINTEGRATING ORAL
Qty: 9 TABLET | Refills: 11 | Status: SHIPPED | OUTPATIENT
Start: 2024-07-24

## 2024-07-24 NOTE — LETTER
2024      Libra Guillen  [address] [facility] [address]      Dear Colleague,    Thank you for referring your patient, Libra Guillen, to the [facility]. Please see a copy of my visit note below.    .       PM&R Clinic Note     Patient Name: Libra Guillen : 1990 Medical Record: [id number]     Requesting Physician/clinician: Ousmane Johansen MD           History of Present Illness:     Libra Guillen is a 34 year old female referred for concussion evaluation    Per ED notes 5/10/24: Patient presents with concern for fall with forehead laceration tonight. Patient was camping with friends. No alcohol or drug use tonight. Patient tripped on a stick and fell forward, hit her forehead on a rock. Patient did not have loss of consciousness, and does not have severe pain around the wound at this time. Patient has been acting and behaving normally despite the wound. Friends present did try to place a Steri-Strip on the wound but it did not come together very well, and they are worried it may need stitches to prevent scarring. Patient has no other complaints or concerns, no neck pain, no extremity pain. Injury happened about 1 hour prior to arrival in the ER.       Today, patient reports the following:    Reports dizziness more with changing position. No reports of hearing issues, tinnitus or nausea or vomiting    HA: left sided, radiates to the right, sharp, better with Tylenol and worse with bright light and loud noise.     Occasional neck pain.    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  No past surgical history on file.         Social History:     Social History     Tobacco Use     Smoking status: Never     Smokeless tobacco: Never   Substance Use Topics     Alcohol use: Yes             "Functional history:     ADLs: as above  iADLs (medication management and finances): as above         Family History:     No family history on file.         Medications:     Current Outpatient Medications   Medication Sig Dispense Refill     HYDROcodone-acetaminophen (NORCO) 5-325 MG tablet Take 1 tablet by mouth every 6 hours as needed for severe pain 6 tablet 0     levonorgestrel (KYLEENA) 19.5 MG IUD 1 Device by Intrauterine route              Allergies:     Allergies   Allergen Reactions     Penicillins Rash     Amoxicillin Unknown     Penicillins Unknown     Latex Rash     In bandaid form causes rash if left in place over 24hrs. Pt does not think this is adhesive related but has not been tested.              ROS:     A focused ROS is negative other than the symptoms noted above in the HPI.         Physical Examiniation:     VITAL SIGNS: There were no vitals taken for this visit.  BMI: Estimated body mass index is 21.95 kg/m  as calculated from the following:    Height as of 4/10/15: 1.676 m (5' 6\").    Weight as of 4/10/15: 61.7 kg (136 lb).    Gen: NAD, pleasant and cooperative   Neuro/MSK:   Normal CN exam  Normal strength and sensory testing  No UMN signs identified         Laboratory/Imaging:     EXAM: CT FACIAL BONES WITHOUT CONTRAST, CT HEAD W/O CONTRAST  LOCATION: [facility]  DATE: 5/12/2024     INDICATION: Fracture of supraorbital area, direct trauma with rock. Traumatic injury.  COMPARISON: None.  TECHNIQUE:   1) Routine CT Head without IV contrast. Multiplanar reformats. Dose reduction techniques were used.  2) Routine CT Facial Bones without IV contrast. Multiplanar reformats. Dose reduction techniques were used.     FINDINGS:  HEAD CT:   INTRACRANIAL CONTENTS: No intracranial hemorrhage, extraaxial collection, or mass effect.  No CT evidence of acute infarct. Normal parenchymal density for age. The ventricles and sulci are normal for age.      OSSEOUS STRUCTURES/SOFT " TISSUES: No calvarial fracture. Left inferior frontal/periorbital soft tissue contusion.      FACIAL BONE CT:  OSSEOUS STRUCTURES/SOFT TISSUES: Left inferior frontal scalp/supraorbital soft tissue swelling/contusion. No facial bone fracture or malalignment. A few scattered dental caries are noted. No periapical lucencies or dental trauma identified.     ORBITAL CONTENTS: No acute abnormality.     SINUSES: No significant paranasal sinus mucosal disease.                                                                      IMPRESSION:  HEAD CT:  1.  No acute intracranial process.     FACIAL BONE CT:  1.  No facial bone or mandibular fracture.           Assessment/Plan:     .(S06.0XAA) Concussion with unknown loss of consciousness status, initial encounter  (primary encounter diagnosis)      Patient education: In depth discussion and education was provided about the assessment and implications of each of the below recommendations for management. Patient indicated readiness to learn, all questions were answered and understanding of material presented was confirmed.    Therapy/equipment/braces: PT for dizziness    Medications: rizatriptan for HA. Side effects advised and patient voiced understanding    Follow up: 3 months    Erica Marroquin MD  Physical Medicine & Rehabilitation    60 minutes spent on the date of the encounter reviewing EPIC notes including ED/UC notes, therapy notes, family care notes, care-everywhere, labs and history and exam documentation. I personally reviewed the image and further activities as noted above on the date of the encounter.          Again, thank you for allowing me to participate in the care of your patient.        Sincerely,        Erica Marroquin MD

## 2024-07-24 NOTE — PROGRESS NOTES
.       PM&R Clinic Note     Patient Name: Libra Guillen : 1990 Medical Record: [id number]     Requesting Physician/clinician: Ousmane Johansen MD           History of Present Illness:     Libra Guillen is a 34 year old female referred for concussion evaluation    Per ED notes 5/10/24: Patient presents with concern for fall with forehead laceration tonight. Patient was camping with friends. No alcohol or drug use tonight. Patient tripped on a stick and fell forward, hit her forehead on a rock. Patient did not have loss of consciousness, and does not have severe pain around the wound at this time. Patient has been acting and behaving normally despite the wound. Friends present did try to place a Steri-Strip on the wound but it did not come together very well, and they are worried it may need stitches to prevent scarring. Patient has no other complaints or concerns, no neck pain, no extremity pain. Injury happened about 1 hour prior to arrival in the ER.       Today, patient reports the following:    Reports dizziness more with changing position. No reports of hearing issues, tinnitus or nausea or vomiting    HA: left sided, radiates to the right, sharp, better with Tylenol and worse with bright light and loud noise.     Occasional neck pain.    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  No past surgical history on file.         Social History:     Social History     Tobacco Use    Smoking status: Never    Smokeless tobacco: Never   Substance Use Topics    Alcohol use: Yes            Functional history:     ADLs: as above  iADLs (medication management and finances): as above         Family History:     No family history on file.         Medications:     Current Outpatient Medications   Medication Sig Dispense Refill     "HYDROcodone-acetaminophen (NORCO) 5-325 MG tablet Take 1 tablet by mouth every 6 hours as needed for severe pain 6 tablet 0    levonorgestrel (KYLEENA) 19.5 MG IUD 1 Device by Intrauterine route              Allergies:     Allergies   Allergen Reactions    Penicillins Rash    Amoxicillin Unknown    Penicillins Unknown    Latex Rash     In bandaid form causes rash if left in place over 24hrs. Pt does not think this is adhesive related but has not been tested.              ROS:     A focused ROS is negative other than the symptoms noted above in the HPI.         Physical Examiniation:     VITAL SIGNS: There were no vitals taken for this visit.  BMI: Estimated body mass index is 21.95 kg/m  as calculated from the following:    Height as of 4/10/15: 1.676 m (5' 6\").    Weight as of 4/10/15: 61.7 kg (136 lb).    Gen: NAD, pleasant and cooperative   Neuro/MSK:   Normal CN exam  Normal strength and sensory testing  No UMN signs identified         Laboratory/Imaging:     EXAM: CT FACIAL BONES WITHOUT CONTRAST, CT HEAD W/O CONTRAST  LOC[facility]: [facility]  DATE: 5/12/2024     INDICATION: Fracture of supraorbital area, direct trauma with rock. Traumatic injury.  COMPARISON: None.  TECHNIQUE:   1) Routine CT Head without IV contrast. Multiplanar reformats. Dose reduction techniques were used.  2) Routine CT Facial Bones without IV contrast. Multiplanar reformats. Dose reduction techniques were used.     FINDINGS:  HEAD CT:   INTRACRANIAL CONTENTS: No intracranial hemorrhage, extraaxial collection, or mass effect.  No CT evidence of acute infarct. Normal parenchymal density for age. The ventricles and sulci are normal for age.      OSSEOUS STRUCTURES/SOFT TISSUES: No calvarial fracture. Left inferior frontal/periorbital soft tissue contusion.      FACIAL BONE CT:  OSSEOUS STRUCTURES/SOFT TISSUES: Left inferior frontal scalp/supraorbital soft tissue swelling/contusion. No facial bone fracture or " malalignment. A few scattered dental caries are noted. No periapical lucencies or dental trauma identified.     ORBITAL CONTENTS: No acute abnormality.     SINUSES: No significant paranasal sinus mucosal disease.                                                                      IMPRESSION:  HEAD CT:  1.  No acute intracranial process.     FACIAL BONE CT:  1.  No facial bone or mandibular fracture.           Assessment/Plan:     .(S06.0XAA) Concussion with unknown loss of consciousness status, initial encounter  (primary encounter diagnosis)      Patient education: In depth discussion and education was provided about the assessment and implications of each of the below recommendations for management. Patient indicated readiness to learn, all questions were answered and understanding of material presented was confirmed.    Therapy/equipment/braces: PT for dizziness    Medications: rizatriptan for HA. Side effects advised and patient voiced understanding    Follow up: 3 months    Erica Marroquin MD  Physical Medicine & Rehabilitation    60 minutes spent on the date of the encounter reviewing EPIC notes including ED/UC notes, therapy notes, family care notes, care-everywhere, labs and history and exam documentation. I personally reviewed the image and further activities as noted above on the date of the encounter.

## 2024-09-08 ENCOUNTER — HEALTH MAINTENANCE LETTER (OUTPATIENT)
Age: 34
End: 2024-09-08

## 2024-09-16 ENCOUNTER — OFFICE VISIT (OUTPATIENT)
Dept: OBGYN | Facility: OTHER | Age: 34
End: 2024-09-16

## 2024-09-16 VITALS — WEIGHT: 138 LBS | DIASTOLIC BLOOD PRESSURE: 81 MMHG | BODY MASS INDEX: 22.27 KG/M2 | SYSTOLIC BLOOD PRESSURE: 116 MMHG

## 2024-09-16 DIAGNOSIS — Z30.433 ENCOUNTER FOR REMOVAL AND REINSERTION OF INTRAUTERINE CONTRACEPTIVE DEVICE: Primary | ICD-10-CM

## 2024-09-16 LAB
HCG UR QL: NEGATIVE
INTERNAL QC OK POCT: NORMAL
POCT KIT EXPIRATION DATE: NORMAL
POCT KIT LOT NUMBER: NORMAL

## 2024-09-16 PROCEDURE — 81025 URINE PREGNANCY TEST: CPT | Performed by: OBSTETRICS & GYNECOLOGY

## 2024-09-16 PROCEDURE — 58301 REMOVE INTRAUTERINE DEVICE: CPT | Performed by: OBSTETRICS & GYNECOLOGY

## 2024-09-16 PROCEDURE — 58300 INSERT INTRAUTERINE DEVICE: CPT | Performed by: OBSTETRICS & GYNECOLOGY

## 2024-09-17 PROBLEM — Z97.5 IUD (INTRAUTERINE DEVICE) IN PLACE: Status: ACTIVE | Noted: 2024-09-17

## 2024-09-17 NOTE — PROGRESS NOTES
CC/HPI: Libra Guillen is a 34 year old who presents to the clinic for an IUD removal and reinsertion.   No LMP recorded. (Menstrual status: IUD). Today's pregnancy test was negative.  Patient has been provided with written information.  I have reviewed the risks of the IUD including pregnancy, PID, life threatening infection, perforation, expulsion, cramping, changes in bleeding and ovarian cysts. Benefits of the IUD and alternative family planning methods have been discussed.  Patients questions have been answered.  Patient has verbalized understanding of risks and benefits and has signed the consent form.    Allergies   Allergen Reactions    Penicillins Rash    Amoxicillin Unknown    Penicillins Unknown    Latex Rash     In bandaid form causes rash if left in place over 24hrs. Pt does not think this is adhesive related but has not been tested.     Current Outpatient Medications   Medication Sig Dispense Refill    levonorgestrel (KYLEENA) 19.5 MG IUD 1 each by Intrauterine route once.      levonorgestrel (KYLEENA) 19.5 MG IUD 1 Device by Intrauterine route      rizatriptan (MAXALT-MLT) 10 MG ODT Take 1 tablet (10 mg) by mouth at onset of headache for migraine May repeat in 2 hours. Max 3 tablets/24 hours. 9 tablet 11    HYDROcodone-acetaminophen (NORCO) 5-325 MG tablet Take 1 tablet by mouth every 6 hours as needed for severe pain (Patient not taking: Reported on 7/24/2024) 6 tablet 0      History reviewed. No pertinent past medical history.  History reviewed. No pertinent family history.  Social History     Socioeconomic History    Marital status:      Spouse name: Not on file    Number of children: Not on file    Years of education: Not on file    Highest education level: Not on file   Occupational History    Not on file   Tobacco Use    Smoking status: Never    Smokeless tobacco: Never   Vaping Use    Vaping status: Never Used   Substance and Sexual Activity    Alcohol use: Yes    Drug use: Never     Sexual activity: Not on file   Other Topics Concern    Not on file   Social History Narrative    Not on file     Social Determinants of Health     Financial Resource Strain: Medium Risk (2/29/2024)    Received from [facility]    Financial Resource Strain     Difficulty of Paying Living Expenses: 2     Difficulty of Paying Living Expenses: 1   Food Insecurity: No Food Insecurity (2/29/2024)    Received from [facility]    Food Insecurity     Worried About Running Out of Food in the Last Year: 1   Transportation Needs: No Transportation Needs (2/29/2024)    Received from [facility]    Transportation Needs     Lack of Transportation (Medical): 1   Physical Activity: Not on file   Stress: Not on file   Social Connections: Socially Integrated (2/29/2024)    Received from [facility]    Social Connections     Frequency of Communication with Friends and Family: 0   Interpersonal Safety: Not on file   Housing Stability: Low Risk  (2/29/2024)    Received from [facility]    Housing Stability     Unable to Pay for Housing in the Last Year: 1     Past Surgical History:   Procedure Laterality Date    yanira Left     three surgeries         EXAM:  /81 (BP Location: Left arm, Cuff Size: Adult Regular)   Wt 62.6 kg (138 lb)   BMI 22.27 kg/m    PELVIC EXAM:  Vulva: No external lesions, normal hair distribution, no adenopathy  Vagina: Moist, pink, no abnormal discharge, well rugated, no lesions  Cervix: smooth, pink, no visible lesions, neg CMT, IUD strings seen coming from os  Uterus: Normal size, anteverted, non-tender, mobile  Ovaries: No mass, non-tender, mobile  Rectal exam: deferred    IUD type: Kyleena  NDC: 08288-661-51  Lot: [id number]   Exp: 12/2025      Procedure:  Uterus assessed for position and is Anteverted.  Speculum inserted.  Betadine prep of  cervix done.  Allis clamp applied at 10/2 o'clock position and gentle traction was appiled to elongate the cervical canal.  The IUD strings were grabbed with a ring forceps and removed intact.  Uterus sounded to 7cm's.  No cervical dilators were used.   IUD inserted in the usual fashion without significant resistance, severe protracted pain or excessive bleeding. The Allis clamp was removed with no bleeding. Strings trimmed to 3 cm's.  Patient  tolerated the procedure well without any prolonged pain or syncopy.      Instructions given to patient regarding checking IUD strings, returning to the clinic if pain or inability to check strings and/or irregular bleeding.  Return to the clinic in 4 weeks for IUD follow up   See AVS for complete instructions    Lisbet Del Real DO

## 2024-09-17 NOTE — PATIENT INSTRUCTIONS
Please call if you any questions.    [address]   [address]  [phone]        Lisbet Del Real,

## 2024-11-21 ENCOUNTER — VIRTUAL VISIT (OUTPATIENT)
Dept: FAMILY MEDICINE | Facility: CLINIC | Age: 34
End: 2024-11-21

## 2024-11-21 DIAGNOSIS — L03.90 CELLULITIS, UNSPECIFIED CELLULITIS SITE: Primary | ICD-10-CM

## 2024-11-21 RX ORDER — CEPHALEXIN 500 MG/1
500 CAPSULE ORAL 2 TIMES DAILY
Qty: 20 CAPSULE | Refills: 0 | Status: SHIPPED | OUTPATIENT
Start: 2024-11-21 | End: 2024-12-01

## 2024-11-21 RX ORDER — MUPIROCIN 20 MG/G
OINTMENT TOPICAL 3 TIMES DAILY
Qty: 30 G | Refills: 0 | Status: SHIPPED | OUTPATIENT
Start: 2024-11-21

## 2024-11-21 NOTE — PATIENT INSTRUCTIONS
Discussed concern for cellulitis of the breast    Recommend treat with Keflex twice a day for 10 days    Use topical mupirocin and cover the area with a Band-Aid 3 times a day for 10 days    Follow-up with any new or worsening symptoms    [place]    Patient understood and verbally consented to the treatment plan. Discussed symptoms that would warrant an urgent or emergent visit. All of the patients' questions were answered. Patient was instructed to contact the clinic if questions or concerns arise. Recommend follow up appointments if symptoms worsen or fail to improve. Recommend follow up as needed. Recommend ER in the case of an emergency.    Cait Garza PA-C    Please note: Voice recognition software may have been used in preparing this note, unintended word substitutions may be present.

## 2024-11-21 NOTE — PROGRESS NOTES
Libra is a 34 year old who is being evaluated via a billable video visit.    How would you like to obtain your AVS? MyChart  If the video visit is dropped, the invitation should be resent by: Text to cell phone: [phone]  Will anyone else be joining your video visit? No      Assessment & Plan     Cellulitis, unspecified cellulitis site  - cephALEXin (KEFLEX) 500 MG capsule; Take 1 capsule (500 mg) by mouth 2 times daily for 10 days.  - mupirocin (BACTROBAN) 2 % external ointment; Apply topically 3 times daily.      I spent a total of 6 minutes on the day of the visit.   Time spent by me today doing chart review, history and exam, documentation and further activities per the note      See Patient Instructions  Patient Instructions   Discussed concern for cellulitis of the breast    Recommend treat with Keflex twice a day for 10 days    Use topical mupirocin and cover the area with a Band-Aid 3 times a day for 10 days    Follow-up with any new or worsening symptoms    [place]    Patient understood and verbally consented to the treatment plan. Discussed symptoms that would warrant an urgent or emergent visit. All of the patients' questions were answered. Patient was instructed to contact the clinic if questions or concerns arise. Recommend follow up appointments if symptoms worsen or fail to improve. Recommend follow up as needed. Recommend ER in the case of an emergency.    Cait Garza PA-C    Please note: Voice recognition software may have been used in preparing this note, unintended word substitutions may be present.      Subjective   Libra is a 34 year old, presenting for the following health issues:  Swelling (Left nipple, discharge, pain)        11/21/2024     1:08 PM   Additional Questions   Roomed by Carolee Lackey of Present Illness       Reason for visit:  Infected nipple  Symptom onset:  3-7 days ago  Symptoms include:  Swollen, sore nipple. Brown, bloody discharge coming from piercing  site. Not a new piercing.  Symptom intensity:  Moderate  Symptom progression:  Worsening  Had these symptoms before:  No  What makes it worse:  No  What makes it better:  Warm compress helps a little but it is still sore and swollen.   She is taking medications regularly.       Few days ago, left nipple sore, sensative, yesterday swelling, day before that, noticed it yesterday, warm compress, really sore and sensative, nipples peirced, peirced for 4 years, paints for a live,, pus too, yellowish, bloody brown, no feevrs or chills, not breastfedding,       Few days ago patient noticed left nipple soreness and sensitivity.  Yesterday she noticed nipple swelling.  She has been using warm compresses and reports soreness around the nipple.  She has the nipples.  Skin there appears 4 years ago.  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  She has noticed bloody brown discharge and yellow to white pus that has been discharging from the left nipple piercing as well.  No fevers or chills.  Currently not breast-feeding.                Objective    Vitals - Patient Reported  Pain Score: Severe Pain (7)  Pain Loc:  (nipple)        Physical Exam   GENERAL: alert and no distress  EYES: Eyes grossly normal to inspection.  No discharge or erythema, or obvious scleral/conjunctival abnormalities.  RESP: No audible wheeze, cough, or visible cyanosis.    SKIN: Visible skin clear. No significant rash, abnormal pigmentation or lesions.  NEURO: Cranial nerves grossly intact.  Mentation and speech appropriate for age.  PSYCH: Appropriate affect, tone, and pace of words    Office Visit on 09/16/2024   Component Date Value Ref Range Status    HCG Qual Urine 09/16/2024 Negative  Negative Final    Internal QC Check POCT 09/16/2024 Valid  Valid Final    POCT Kit Lot Number 09/16/2024 [id number]   Final    POCT Kit Expiration Date 09/16/2024 6 27 25   Final     No results found for any  visits on 11/21/24.  No results found.      Video-Visit Details    Type of service:  Video Visit   Originating Location (pt. Location): Home    Distant Location (provider location):  On-site  Platform used for Video Visit: Nneka  Signed Electronically by: Cait Garza PA-C

## 2024-11-25 ENCOUNTER — HOSPITAL ENCOUNTER (EMERGENCY)
Facility: CLINIC | Age: 34
Discharge: HOME | End: 2024-11-25
Attending: PHYSICIAN ASSISTANT | Admitting: PHYSICIAN ASSISTANT

## 2024-11-25 ENCOUNTER — TELEPHONE (OUTPATIENT)
Dept: NURSING | Facility: CLINIC | Age: 34
End: 2024-11-25

## 2024-11-25 VITALS
HEART RATE: 91 BPM | RESPIRATION RATE: 16 BRPM | SYSTOLIC BLOOD PRESSURE: 116 MMHG | DIASTOLIC BLOOD PRESSURE: 56 MMHG | OXYGEN SATURATION: 100 % | BODY MASS INDEX: 20.98 KG/M2 | WEIGHT: 130 LBS | TEMPERATURE: 97.8 F

## 2024-11-25 DIAGNOSIS — N61.0 NIPPLE INFECTION: ICD-10-CM

## 2024-11-25 PROCEDURE — 87070 CULTURE OTHR SPECIMN AEROBIC: CPT | Performed by: PHYSICIAN ASSISTANT

## 2024-11-25 PROCEDURE — 99283 EMERGENCY DEPT VISIT LOW MDM: CPT | Performed by: PHYSICIAN ASSISTANT

## 2024-11-25 RX ORDER — SULFAMETHOXAZOLE AND TRIMETHOPRIM 800; 160 MG/1; MG/1
1 TABLET ORAL 2 TIMES DAILY
Qty: 14 TABLET | Refills: 0 | Status: SHIPPED | OUTPATIENT
Start: 2024-11-25

## 2024-11-25 ASSESSMENT — COLUMBIA-SUICIDE SEVERITY RATING SCALE - C-SSRS
2. HAVE YOU ACTUALLY HAD ANY THOUGHTS OF KILLING YOURSELF IN THE PAST MONTH?: NO
6. HAVE YOU EVER DONE ANYTHING, STARTED TO DO ANYTHING, OR PREPARED TO DO ANYTHING TO END YOUR LIFE?: NO
1. IN THE PAST MONTH, HAVE YOU WISHED YOU WERE DEAD OR WISHED YOU COULD GO TO SLEEP AND NOT WAKE UP?: NO

## 2024-11-25 ASSESSMENT — ACTIVITIES OF DAILY LIVING (ADL): ADLS_ACUITY_SCORE: 41

## 2024-11-25 NOTE — ED TRIAGE NOTES
Patient states she has been on abx since this past Thursday for an infection to her L nipple/breast, reports she feels like it's getting worse and more painful.      Triage Assessment (Adult)       Row Name 11/25/24 1146          Triage Assessment    Airway WDL WDL        Respiratory WDL    Respiratory WDL WDL        Skin Circulation/Temperature WDL    Skin Circulation/Temperature WDL X        Cardiac WDL    Cardiac WDL WDL

## 2024-11-25 NOTE — ED PROVIDER NOTES
History     Chief Complaint   Patient presents with    Wound Check       HPI  Libra Guillen is a 34 year old female who presents to the emergency department for a wound evaluation. The patient was seen on 11/21 for an infection of her left nipple.  Symptoms started a few days prior with some sensitivity to the area but then it started draining discharge and got more swollen and sore.  She was prescribed Keflex for presumed infection.  Since then, the patient reports his symptoms have gotten no better.  She has continued to have drainage from the nipple and it is very swollen and tender.  She has not had any fevers.  She has been taking the Keflex as prescribed as well as using the Bactroban ointment.        Allergies:  Allergies   Allergen Reactions    Penicillins Rash    Amoxicillin Unknown    Penicillins Unknown    Latex Rash     In bandaid form causes rash if left in place over 24hrs. Pt does not think this is adhesive related but has not been tested.       Problem List:    Patient Active Problem List    Diagnosis Date Noted    IUD (intrauterine device) in place 09/17/2024     Priority: Medium     Kyleena placed 9/16/2024          Past Medical History:    History reviewed. No pertinent past medical history.    Past Surgical History:    Past Surgical History:   Procedure Laterality Date    colinbone Left     three surgeries       Family History:    History reviewed. No pertinent family history.    Social History:  Marital Status:   [4]  Social History     Tobacco Use    Smoking status: Never    Smokeless tobacco: Never   Vaping Use    Vaping status: Never Used   Substance Use Topics    Alcohol use: Yes    Drug use: Never        Medications:    sulfamethoxazole-trimethoprim (BACTRIM DS) 800-160 MG tablet  cephALEXin (KEFLEX) 500 MG capsule  HYDROcodone-acetaminophen (NORCO) 5-325 MG tablet  levonorgestrel (KYLEENA) 19.5 MG IUD  levonorgestrel (KYLEENA) 19.5 MG IUD  mupirocin (BACTROBAN) 2 % external  ointment  rizatriptan (MAXALT-MLT) 10 MG ODT          Review of Systems   All other systems reviewed and are negative.          Physical Exam   BP: 116/56  Pulse: 91  Temp: 97.8  F (36.6  C)  Resp: 16  Weight: 59 kg (130 lb)  SpO2: 100 %      Physical Exam  Vitals and nursing note reviewed.   Constitutional:       General: She is not in acute distress.     Appearance: Normal appearance. She is not ill-appearing, toxic-appearing or diaphoretic.   HENT:      Head: Normocephalic and atraumatic.      Nose: Nose normal.      Mouth/Throat:      Mouth: Mucous membranes are moist.      Pharynx: Oropharynx is clear.   Eyes:      Conjunctiva/sclera: Conjunctivae normal.      Pupils: Pupils are equal, round, and reactive to light.   Pulmonary:      Effort: Pulmonary effort is normal. No respiratory distress.   Chest:      Comments: Left nipple is significantly swollen and exquisitely tender.  Piercing in place with thin white drainage noted to medial aspect.  No palpable fluctuance underlying the nipple and the breast itself.  Musculoskeletal:      Cervical back: Neck supple.   Skin:     General: Skin is warm and dry.   Neurological:      General: No focal deficit present.      Mental Status: She is alert. Mental status is at baseline.   Psychiatric:         Mood and Affect: Mood normal.             ED Course        Procedures      No results found for this or any previous visit (from the past 24 hours).    Medications - No data to display      Assessments & Plan (with Medical Decision Making)  Libra Guillen is a 34 year old female who presented to the ED complaining of left nipple pain and swelling.  Ongoing for almost a week now.  She has been on Keflex for 4 days without improvement.  On arrival she was afebrile with normal vital signs.  Exam as above, findings are concerning for persistent left nipple infection related to piercing.  I collected a wound culture though drainage was scant, unsure if it was enough.  We will  broaden coverage to include Bactrim going forward and I advise she continue with the Keflex as well.  She should continue with Bactroban ointment to the area and heat packs as tolerated.  If symptoms are not improved within a couple days she was advised to follow-up for reevaluation.  For any new or worsening concerns she should return to the ED.  All questions answered and patient discharged home in stable condition.     I have reviewed the nursing notes.    I have reviewed the findings, diagnosis, plan and need for follow up with the patient.      New Prescriptions    SULFAMETHOXAZOLE-TRIMETHOPRIM (BACTRIM DS) 800-160 MG TABLET    Take 1 tablet by mouth 2 times daily.       Final diagnoses:   Nipple infection     Note: Chart documentation done in part with Dragon Voice Recognition software. Although reviewed after completion, some word and grammatical errors may remain.     11/25/2024   [facility] EMERGENCY DEPT       Bianca Potts PA-C  11/25/24 5818

## 2024-11-25 NOTE — Clinical Note
Libra Guillen was seen and treated in our emergency department on 11/25/2024.  She may return to work on 11/26/2024.       If you have any questions or concerns, please don't hesitate to call.      Bianca Potts PA-C

## 2024-11-25 NOTE — TELEPHONE ENCOUNTER
Significant preliminary result on aerobic bacterial culture gram stain from left breast abscess called to ED Results team from [facility]. Results are as follows:           Patient was prescribed sulfamethoxazole-trimethoprim (BACTRIM DS) 800-160 MG tablet - Take 1 tablet by mouth 2 times daily x 7 day. Will await final results per protocol.    [place] Us RN  11/25/24 4:30 PM  [facility]  Emergency Department Lab Results RN

## 2024-11-25 NOTE — DISCHARGE INSTRUCTIONS
Please take the antibiotics prescribed in addition to the ones you were previously prescribed.  Continue with the Bactroban ointment to the nipple and apply heat packs as well.  If symptoms are not improved within a couple days have things we looked at and if things get worse return to the emergency department right away.    Thank you for choosing [facility] Emergency [facility]. It was a pleasure taking care of you today. If you have any questions, please call [phone].    Sarah Allen, [place]

## 2024-11-28 LAB
BACTERIA ABSC ANAEROBE+AEROBE CULT: ABNORMAL
BACTERIA ABSC ANAEROBE+AEROBE CULT: ABNORMAL
GRAM STAIN RESULT: ABNORMAL

## 2024-12-03 ENCOUNTER — APPOINTMENT (OUTPATIENT)
Dept: GENERAL RADIOLOGY | Facility: CLINIC | Age: 34
End: 2024-12-03
Attending: STUDENT IN AN ORGANIZED HEALTH CARE EDUCATION/TRAINING PROGRAM

## 2024-12-03 ENCOUNTER — HOSPITAL ENCOUNTER (EMERGENCY)
Facility: CLINIC | Age: 34
Discharge: HOME | End: 2024-12-03
Attending: STUDENT IN AN ORGANIZED HEALTH CARE EDUCATION/TRAINING PROGRAM | Admitting: STUDENT IN AN ORGANIZED HEALTH CARE EDUCATION/TRAINING PROGRAM

## 2024-12-03 VITALS
DIASTOLIC BLOOD PRESSURE: 50 MMHG | RESPIRATION RATE: 16 BRPM | TEMPERATURE: 97.5 F | HEIGHT: 66 IN | WEIGHT: 140 LBS | HEART RATE: 85 BPM | OXYGEN SATURATION: 100 % | SYSTOLIC BLOOD PRESSURE: 121 MMHG | BODY MASS INDEX: 22.5 KG/M2

## 2024-12-03 DIAGNOSIS — L03.313 CELLULITIS OF CHEST WALL: ICD-10-CM

## 2024-12-03 LAB
ALBUMIN UR-MCNC: NEGATIVE MG/DL
APPEARANCE UR: CLEAR
BACTERIA #/AREA URNS HPF: ABNORMAL /HPF
BILIRUB UR QL STRIP: NEGATIVE
COLOR UR AUTO: YELLOW
DEPRECATED S PYO AG THROAT QL EIA: NEGATIVE
FLUAV RNA SPEC QL NAA+PROBE: NEGATIVE
FLUBV RNA RESP QL NAA+PROBE: NEGATIVE
GLUCOSE UR STRIP-MCNC: NEGATIVE MG/DL
GROUP A STREP BY PCR: NOT DETECTED
HCG UR QL: NEGATIVE
HGB UR QL STRIP: NEGATIVE
KETONES UR STRIP-MCNC: NEGATIVE MG/DL
LEUKOCYTE ESTERASE UR QL STRIP: NEGATIVE
MUCOUS THREADS #/AREA URNS LPF: PRESENT /LPF
NITRATE UR QL: NEGATIVE
PH UR STRIP: 6.5 [PH] (ref 5–7)
RBC URINE: 3 /HPF
RSV RNA SPEC NAA+PROBE: NEGATIVE
SARS-COV-2 RNA RESP QL NAA+PROBE: NEGATIVE
SP GR UR STRIP: 1.03 (ref 1–1.03)
SQUAMOUS EPITHELIAL: 4 /HPF
UROBILINOGEN UR STRIP-MCNC: NORMAL MG/DL
WBC URINE: 1 /HPF

## 2024-12-03 PROCEDURE — 87637 SARSCOV2&INF A&B&RSV AMP PRB: CPT | Performed by: STUDENT IN AN ORGANIZED HEALTH CARE EDUCATION/TRAINING PROGRAM

## 2024-12-03 PROCEDURE — 81025 URINE PREGNANCY TEST: CPT | Performed by: STUDENT IN AN ORGANIZED HEALTH CARE EDUCATION/TRAINING PROGRAM

## 2024-12-03 PROCEDURE — 81001 URINALYSIS AUTO W/SCOPE: CPT | Performed by: STUDENT IN AN ORGANIZED HEALTH CARE EDUCATION/TRAINING PROGRAM

## 2024-12-03 PROCEDURE — 99283 EMERGENCY DEPT VISIT LOW MDM: CPT | Performed by: STUDENT IN AN ORGANIZED HEALTH CARE EDUCATION/TRAINING PROGRAM

## 2024-12-03 PROCEDURE — 71046 X-RAY EXAM CHEST 2 VIEWS: CPT

## 2024-12-03 PROCEDURE — 87651 STREP A DNA AMP PROBE: CPT | Performed by: STUDENT IN AN ORGANIZED HEALTH CARE EDUCATION/TRAINING PROGRAM

## 2024-12-03 PROCEDURE — 81003 URINALYSIS AUTO W/O SCOPE: CPT | Performed by: STUDENT IN AN ORGANIZED HEALTH CARE EDUCATION/TRAINING PROGRAM

## 2024-12-03 PROCEDURE — 99284 EMERGENCY DEPT VISIT MOD MDM: CPT | Performed by: STUDENT IN AN ORGANIZED HEALTH CARE EDUCATION/TRAINING PROGRAM

## 2024-12-03 RX ORDER — SULFAMETHOXAZOLE AND TRIMETHOPRIM 800; 160 MG/1; MG/1
1 TABLET ORAL 2 TIMES DAILY
Qty: 6 TABLET | Refills: 0 | Status: SHIPPED | OUTPATIENT
Start: 2024-12-03 | End: 2024-12-06

## 2024-12-03 ASSESSMENT — ENCOUNTER SYMPTOMS
APPETITE CHANGE: 1
FATIGUE: 1

## 2024-12-03 ASSESSMENT — ACTIVITIES OF DAILY LIVING (ADL)
ADLS_ACUITY_SCORE: 41
ADLS_ACUITY_SCORE: 41

## 2024-12-03 NOTE — DISCHARGE INSTRUCTIONS
You are seen today for generalized congestion and sore throat and some concerns about your breast.  Your breast actually looks significantly better.  Would recommend continuing her soaks and complete the course of antibiotics prescribed.  You likely have an associated viral illness.  Your lab work otherwise was reassuring as well as your chest x-ray.  Please follow-up with primary care doctor in a week for any other acute concerns of no significant proven occurs.  Or return for reevaluation.

## 2024-12-03 NOTE — ED TRIAGE NOTES
Patient states she was diagnosed with MRSA to left breast piercing site. Wound is worsening since antibiotic completed. She thinks her cough, fever, and shortness of breath are related to her wound infection also.      Triage Assessment (Adult)       Row Name 12/03/24 1003          Triage Assessment    Airway WDL WDL        Respiratory WDL    Respiratory WDL X;cough        Skin Circulation/Temperature WDL    Skin Circulation/Temperature WDL X  left breast wound

## 2024-12-03 NOTE — Clinical Note
Libra Guillen was seen and treated in our emergency department on 12/3/2024.  She may return to work on 12/04/2024.       If you have any questions or concerns, please don't hesitate to call.      Denise Chiang MD

## 2024-12-03 NOTE — ED PROVIDER NOTES
History     Chief Complaint   Patient presents with    Wound Infection     HPI  Libra Guillen is a 34 year old female who presenting with concerns about continued l left breast infection as well as generalized sore throat nasal congestion headache and bodyaches.  She has not had any vomiting or diarrhea or abdominal pain or changes in urinations.  She is on a IUD and does not have regular periods.    Allergies:  Allergies   Allergen Reactions    Penicillins Rash    Amoxicillin Unknown    Latex Rash     In bandaid form causes rash if left in place over 24hrs. Pt does not think this is adhesive related but has not been tested.       Problem List:    Patient Active Problem List    Diagnosis Date Noted    IUD (intrauterine device) in place 09/17/2024     Priority: Medium     Kyleena placed 9/16/2024          Past Medical History:    Past Medical History:   Diagnosis Date    MRSA infection        Past Surgical History:    Past Surgical History:   Procedure Laterality Date    collarbone Left     three surgeries       Family History:    No family history on file.    Social History:  Marital Status:   [4]  Social History     Tobacco Use    Smoking status: Never    Smokeless tobacco: Never   Vaping Use    Vaping status: Never Used   Substance Use Topics    Alcohol use: Yes    Drug use: Never        Medications:    sulfamethoxazole-trimethoprim (BACTRIM DS) 800-160 MG tablet  HYDROcodone-acetaminophen (NORCO) 5-325 MG tablet  levonorgestrel (KYLEENA) 19.5 MG IUD  levonorgestrel (KYLEENA) 19.5 MG IUD  mupirocin (BACTROBAN) 2 % external ointment  rizatriptan (MAXALT-MLT) 10 MG ODT  sulfamethoxazole-trimethoprim (BACTRIM DS) 800-160 MG tablet          Review of Systems   Constitutional:  Positive for appetite change and fatigue.   Musculoskeletal:         Body aches   Skin:         Concerned about continued mild discomfort to the left breast where the infection was at around her nipple ring   All other systems  "reviewed and are negative.      Physical Exam   BP: 121/50  Pulse: 85  Temp: 97.5  F (36.4  C)  Resp: 16  Height: 167.6 cm (5' 6\")  Weight: 63.5 kg (140 lb)  SpO2: 100 %      Physical Exam  Vitals and nursing note reviewed.   Constitutional:       General: She is not in acute distress.     Appearance: Normal appearance. She is normal weight. She is not ill-appearing, toxic-appearing or diaphoretic.   HENT:      Head: Normocephalic and atraumatic.      Mouth/Throat:      Mouth: Mucous membranes are moist.   Eyes:      General: No scleral icterus.     Conjunctiva/sclera: Conjunctivae normal.   Cardiovascular:      Rate and Rhythm: Normal rate and regular rhythm.      Pulses: Normal pulses.      Heart sounds: Normal heart sounds. No murmur heard.  Pulmonary:      Effort: Pulmonary effort is normal. No respiratory distress.      Breath sounds: Normal breath sounds.   Abdominal:      General: Abdomen is flat. Bowel sounds are normal.      Palpations: Abdomen is soft.      Tenderness: There is no abdominal tenderness.   Musculoskeletal:         General: Normal range of motion.      Cervical back: Normal range of motion and neck supple.   Skin:     General: Skin is warm.      Capillary Refill: Capillary refill takes less than 2 seconds.      Findings: No rash.   Neurological:      General: No focal deficit present.      Mental Status: She is alert and oriented to person, place, and time. Mental status is at baseline.   Psychiatric:         Mood and Affect: Mood normal.         ED Course        Procedures        Results for orders placed or performed during the hospital encounter of 12/03/24 (from the past 24 hours)   Influenza A/B, RSV and SARS-CoV2 PCR (COVID-19) Nose    Specimen: Nose; Swab   Result Value Ref Range    Influenza A PCR Negative Negative    Influenza B PCR Negative Negative    RSV PCR Negative Negative    SARS CoV2 PCR Negative Negative    Narrative    Testing was performed using the [employer] Xpress " CoV2/Flu/RSV Assay on the [employer] GeneXpert Instrument. This test should be ordered for the detection of SARS-CoV2, influenza, and RSV viruses in individuals with signs and symptoms of respiratory tract infection. This test is for in vitro diagnostic use under the [place] FDA for laboratories certified under CLIA to perform high or moderate complexity testing. This test has been [place] FDA cleared. A negative result does not rule out the presence of PCR inhibitors in the specimen or target RNA in concentration below the limit of detection for the assay. If only one viral target is positive but coinfection with multiple targets is suspected, the sample should be re-tested with another FDA cleared, approved, or authorized test, if coninfection would change clinical management. This test was validated by the [facility] [employer]. These laboratories are certified under the Clinical Laboratory Improvement Amendments of 1988 (CLIA-88) as qualified to perfom high complexity laboratory testing.   Streptococcus A Rapid Screen w/Reflex to PCR    Specimen: Throat; Swab   Result Value Ref Range    Group A Strep antigen Negative Negative   HCG qualitative urine   Result Value Ref Range    hCG Urine Qualitative Negative Negative   UA with Microscopic reflex to Culture    Specimen: Urine, Midstream   Result Value Ref Range    Color Urine Yellow Colorless, Straw, Light Yellow, Yellow    Appearance Urine Clear Clear    Glucose Urine Negative Negative mg/dL    Bilirubin Urine Negative Negative    Ketones Urine Negative Negative mg/dL    Specific Gravity Urine 1.026 1.003 - 1.035    Blood Urine Negative Negative    pH Urine 6.5 5.0 - 7.0    Protein Albumin Urine Negative Negative mg/dL    Urobilinogen Urine Normal Normal, 2.0 mg/dL    Nitrite Urine Negative Negative    Leukocyte Esterase Urine Negative Negative    Bacteria Urine Few (A) None Seen /HPF    Mucus Urine Present (A) None Seen /LPF    RBC Urine 3 (H) <=2 /HPF    WBC Urine  1 <=5 /HPF    Squamous Epithelials Urine 4 (H) <=1 /HPF    Narrative    Urine Culture not indicated   XR Chest 2 Views    Narrative    CHEST TWO VIEWS 12/3/2024 11:16 AM     HISTORY: cough    COMPARISON: None.       Impression    IMPRESSION: No acute cardiopulmonary abnormality. Plate and screw  fixation hardware is seen along the left clavicle.    SAADIA OLIVA MD         SYSTEM ID:  [id number]       Medications - No data to display    Assessments & Plan (with Medical Decision Making)     I have reviewed the nursing notes.    I have reviewed the findings, diagnosis, plan and need for follow up with the patient.      Medical Decision Making  34-year-old female presenting for reevaluation of her left breast after completing a course of antibiotics for secondary breast cellulitis from nipple ring piercing.  She notes that it looks much better but the discomfort returned shortly after complete the course of antibiotics.  She also has some mild bodyaches chills and just generally unwell feeling.  She has not had any notable fevers.  On evaluation her blood pressure is 121/50, temperature 97.5 and a pulse of 85 with oxygen saturation 100% room air.  Examination of the breast does not show any obvious rash or erythema or swelling but does have some mild tenderness around the nipple area where the infection was.  We discussed continuing the last 3 days of Bactrim and sent a prescription for patient.  We also discussed outpatient follow-up.  Lab work was ordered for ruling out of any UTI association and/or viral illness which was negative including rapid strep negative and hCG negative.  Discussed fines with patient as well as outpatient follow-up.  Antibiotics prescribed.  Imaging was reviewed and no obvious signs of pneumonia present.  Patient discharged home.    New Prescriptions    SULFAMETHOXAZOLE-TRIMETHOPRIM (BACTRIM DS) 800-160 MG TABLET    Take 1 tablet by mouth 2 times daily for 3 days.       Final diagnoses:    Cellulitis of chest wall       12/3/2024   [facility] EMERGENCY DEPT       Denise Chiang MD  12/03/24 1132

## 2024-12-10 ENCOUNTER — OFFICE VISIT (OUTPATIENT)
Dept: FAMILY MEDICINE | Facility: CLINIC | Age: 34
End: 2024-12-10

## 2024-12-10 VITALS
HEART RATE: 80 BPM | DIASTOLIC BLOOD PRESSURE: 64 MMHG | BODY MASS INDEX: 22.5 KG/M2 | TEMPERATURE: 98.1 F | SYSTOLIC BLOOD PRESSURE: 112 MMHG | HEIGHT: 66 IN | WEIGHT: 140 LBS | OXYGEN SATURATION: 100 % | RESPIRATION RATE: 14 BRPM

## 2024-12-10 DIAGNOSIS — R20.2 TINGLING: ICD-10-CM

## 2024-12-10 DIAGNOSIS — Z13.220 LIPID SCREENING: ICD-10-CM

## 2024-12-10 DIAGNOSIS — I47.10 SVT (SUPRAVENTRICULAR TACHYCARDIA) (H): ICD-10-CM

## 2024-12-10 DIAGNOSIS — Z86.14 HISTORY OF MRSA INFECTION: ICD-10-CM

## 2024-12-10 DIAGNOSIS — Z97.5 IUD (INTRAUTERINE DEVICE) IN PLACE: ICD-10-CM

## 2024-12-10 DIAGNOSIS — Z12.4 CERVICAL CANCER SCREENING: ICD-10-CM

## 2024-12-10 DIAGNOSIS — Z00.00 ROUTINE GENERAL MEDICAL EXAMINATION AT A HEALTH CARE FACILITY: Primary | ICD-10-CM

## 2024-12-10 LAB
ALBUMIN SERPL BCG-MCNC: 4.7 G/DL (ref 3.5–5.2)
ALP SERPL-CCNC: 51 U/L (ref 40–150)
ALT SERPL W P-5'-P-CCNC: 20 U/L (ref 0–50)
ANION GAP SERPL CALCULATED.3IONS-SCNC: 9 MMOL/L (ref 7–15)
AST SERPL W P-5'-P-CCNC: 21 U/L (ref 0–45)
BILIRUB SERPL-MCNC: 0.4 MG/DL
BUN SERPL-MCNC: 17.2 MG/DL (ref 6–20)
CALCIUM SERPL-MCNC: 8.9 MG/DL (ref 8.8–10.4)
CHLORIDE SERPL-SCNC: 104 MMOL/L (ref 98–107)
CHOLEST SERPL-MCNC: 173 MG/DL
CREAT SERPL-MCNC: 1.1 MG/DL (ref 0.51–0.95)
EGFRCR SERPLBLD CKD-EPI 2021: 68 ML/MIN/1.73M2
ERYTHROCYTE [DISTWIDTH] IN BLOOD BY AUTOMATED COUNT: 12.4 % (ref 10–15)
FASTING STATUS PATIENT QL REPORTED: NO
FASTING STATUS PATIENT QL REPORTED: NO
GLUCOSE SERPL-MCNC: 72 MG/DL (ref 70–99)
HCO3 SERPL-SCNC: 26 MMOL/L (ref 22–29)
HCT VFR BLD AUTO: 34.2 % (ref 35–47)
HDLC SERPL-MCNC: 60 MG/DL
HGB BLD-MCNC: 12 G/DL (ref 11.7–15.7)
LDLC SERPL CALC-MCNC: 94 MG/DL
MCH RBC QN AUTO: 30.2 PG (ref 26.5–33)
MCHC RBC AUTO-ENTMCNC: 35.1 G/DL (ref 31.5–36.5)
MCV RBC AUTO: 86 FL (ref 78–100)
NONHDLC SERPL-MCNC: 113 MG/DL
PLATELET # BLD AUTO: 407 10E3/UL (ref 150–450)
POTASSIUM SERPL-SCNC: 4.1 MMOL/L (ref 3.4–5.3)
PROT SERPL-MCNC: 6.9 G/DL (ref 6.4–8.3)
RBC # BLD AUTO: 3.97 10E6/UL (ref 3.8–5.2)
SODIUM SERPL-SCNC: 139 MMOL/L (ref 135–145)
TRIGL SERPL-MCNC: 93 MG/DL
TSH SERPL DL<=0.005 MIU/L-ACNC: 1.02 UIU/ML (ref 0.3–4.2)
VIT B12 SERPL-MCNC: 1010 PG/ML (ref 232–1245)
WBC # BLD AUTO: 8.9 10E3/UL (ref 4–11)

## 2024-12-10 PROCEDURE — 85027 COMPLETE CBC AUTOMATED: CPT | Performed by: STUDENT IN AN ORGANIZED HEALTH CARE EDUCATION/TRAINING PROGRAM

## 2024-12-10 PROCEDURE — 99395 PREV VISIT EST AGE 18-39: CPT | Performed by: STUDENT IN AN ORGANIZED HEALTH CARE EDUCATION/TRAINING PROGRAM

## 2024-12-10 PROCEDURE — 80053 COMPREHEN METABOLIC PANEL: CPT | Performed by: STUDENT IN AN ORGANIZED HEALTH CARE EDUCATION/TRAINING PROGRAM

## 2024-12-10 PROCEDURE — 80061 LIPID PANEL: CPT | Performed by: STUDENT IN AN ORGANIZED HEALTH CARE EDUCATION/TRAINING PROGRAM

## 2024-12-10 PROCEDURE — 87624 HPV HI-RISK TYP POOLED RSLT: CPT | Performed by: STUDENT IN AN ORGANIZED HEALTH CARE EDUCATION/TRAINING PROGRAM

## 2024-12-10 PROCEDURE — 84443 ASSAY THYROID STIM HORMONE: CPT | Performed by: STUDENT IN AN ORGANIZED HEALTH CARE EDUCATION/TRAINING PROGRAM

## 2024-12-10 PROCEDURE — 36415 COLL VENOUS BLD VENIPUNCTURE: CPT | Performed by: STUDENT IN AN ORGANIZED HEALTH CARE EDUCATION/TRAINING PROGRAM

## 2024-12-10 PROCEDURE — 82607 VITAMIN B-12: CPT | Performed by: STUDENT IN AN ORGANIZED HEALTH CARE EDUCATION/TRAINING PROGRAM

## 2024-12-10 SDOH — HEALTH STABILITY: PHYSICAL HEALTH: ON AVERAGE, HOW MANY DAYS PER WEEK DO YOU ENGAGE IN MODERATE TO STRENUOUS EXERCISE (LIKE A BRISK WALK)?: 6 DAYS

## 2024-12-10 ASSESSMENT — SOCIAL DETERMINANTS OF HEALTH (SDOH): HOW OFTEN DO YOU GET TOGETHER WITH FRIENDS OR RELATIVES?: ONCE A WEEK

## 2024-12-10 ASSESSMENT — PAIN SCALES - GENERAL: PAINLEVEL_OUTOF10: NO PAIN (0)

## 2024-12-10 NOTE — PATIENT INSTRUCTIONS
Patient Education   Preventive Care Advice   This is general advice given by our system to help you stay healthy. However, your care team may have specific advice just for you. Please talk to your care team about your preventive care needs.  Nutrition  Eat 5 or more servings of fruits and vegetables each day.  Try wheat bread, brown rice and whole grain pasta (instead of white bread, rice, and pasta).  Get enough calcium and vitamin D. Check the label on foods and aim for 100% of the RDA (recommended daily allowance).  Lifestyle  Exercise at least 150 minutes each week  (30 minutes a day, 5 days a week).  Do muscle strengthening activities 2 days a week. These help control your weight and prevent disease.  No smoking.  Wear sunscreen to prevent skin cancer.  Have a dental exam and cleaning every 6 months.  Yearly exams  See your health care team every year to talk about:  Any changes in your health.  Any medicines your care team has prescribed.  Preventive care, family planning, and ways to prevent chronic diseases.  Shots (vaccines)   HPV shots (up to age 26), if you've never had them before.  Hepatitis B shots (up to age 59), if you've never had them before.  COVID-19 shot: Get this shot when it's due.  Flu shot: Get a flu shot every year.  Tetanus shot: Get a tetanus shot every 10 years.  Pneumococcal, hepatitis A, and RSV shots: Ask your care team if you need these based on your risk.  Shingles shot (for age 50 and up)  General health tests  Diabetes screening:  Starting at age 35, Get screened for diabetes at least every 3 years.  If you are younger than age 35, ask your care team if you should be screened for diabetes.  Cholesterol test: At age 39, start having a cholesterol test every 5 years, or more often if advised.  Bone density scan (DEXA): At age 50, ask your care team if you should have this scan for osteoporosis (brittle bones).  Hepatitis C: Get tested at least once in your life.  STIs (sexually  transmitted infections)  Before age 24: Ask your care team if you should be screened for STIs.  After age 24: Get screened for STIs if you're at risk. You are at risk for STIs (including HIV) if:  You are sexually active with more than one person.  You don't use condoms every time.  You or a partner was diagnosed with a sexually transmitted infection.  If you are at risk for HIV, ask about PrEP medicine to prevent HIV.  Get tested for HIV at least once in your life, whether you are at risk for HIV or not.  Cancer screening tests  Cervical cancer screening: If you have a cervix, begin getting regular cervical cancer screening tests starting at age 21.  Breast cancer scan (mammogram): If you've ever had breasts, begin having regular mammograms starting at age 40. This is a scan to check for breast cancer.  Colon cancer screening: It is important to start screening for colon cancer at age 45.  Have a colonoscopy test every 10 years (or more often if you're at risk) Or, ask your provider about stool tests like a FIT test every year or Cologuard test every 3 years.  To learn more about your testing options, visit:   .  For help making a decision, visit:   https://bit.ly/sf16768.  Prostate cancer screening test: If you have a prostate, ask your care team if a prostate cancer screening test (PSA) at age 55 is right for you.  Lung cancer screening: If you are a current or former smoker ages 50 to 80, ask your care team if ongoing lung cancer screenings are right for you.  For informational purposes only. Not to replace the advice of your health care provider. Copyright   2023 [employer]. All rights reserved. Clinically reviewed by the [facility]. [employer] [id number] - REV 01/24.

## 2024-12-10 NOTE — PROGRESS NOTES
Preventive Care Visit  [facility]  Pablo Craig MD, Family Medicine  Dec 10, 2024      Assessment & Plan   Problem List Items Addressed This Visit          Urinary    IUD (intrauterine device) in place     Other Visit Diagnoses       Routine general medical examination at a health care facility    -  Primary    Cervical cancer screening        Relevant Orders    HPV and Gynecologic Cytology Panel - Recommended Age 30 - 65 Years    History of MRSA infection        SVT (supraventricular tachycardia) (H)        Lipid screening        Relevant Orders    Lipid panel reflex to direct LDL Non-fasting    Tingling        Relevant Orders    TSH with free T4 reflex    Vitamin B12    CBC with platelets (Completed)    Comprehensive metabolic panel (BMP + Alb, Alk Phos, ALT, AST, Total. Bili, TP)           Age-appropriate screening and immunization reviewed.  Repeat Pap today.  Given tingling I did add on some neuropathy studies but may be related to her footwear without diffuse peripheral neuropathy.  Does not seem consistent with tarsal tunnel syndrome.  Encouraged supportive footwear and let me know if symptoms persisting.  Follow-up on labs.    Patient has been advised of split billing requirements and indicates understanding: Yes       Counseling  Appropriate preventive services were addressed with this patient via screening, questionnaire, or discussion as appropriate for fall prevention, nutrition, physical activity, Tobacco-use cessation, social engagement, weight loss and cognition.  Checklist reviewing preventive services available has been given to the patient.  Reviewed patient's diet, addressing concerns and/or questions.   The patient was instructed to see the dentist every 6 months.         Lily Smyth is a 34 year old, presenting for the following:  Physical        12/10/2024     1:25 PM   Additional Questions   Roomed by [facility]  Directive  Patient does not have a Health Care Directive: Discussed advance care planning with patient; information given to patient to review.      12/10/2024   General Health   How would you rate your overall physical health? (!) FAIR   Feel stress (tense, anxious, or unable to sleep) Only a little      (!) STRESS CONCERN      12/10/2024   Nutrition   Three or more servings of calcium each day? Yes   Diet: Regular (no restrictions)   How many servings of fruit and vegetables per day? (!) 2-3   How many sweetened beverages each day? 0-1            12/10/2024   Exercise   Days per week of moderate/strenous exercise 6 days            12/10/2024   Social Factors   Frequency of gathering with friends or relatives Once a week   Worry food won't last until get money to buy more No   Food not last or not have enough money for food? No   Do you have housing? (Housing is defined as stable permanent housing and does not include staying ouside in a car, in a tent, in an abandoned building, in an overnight shelter, or couch-surfing.) Yes   Are you worried about losing your housing? No   Lack of transportation? No   Unable to get utilities (heat,electricity)? No            12/10/2024   Dental   Dentist two times every year? (!) NO            12/10/2024   TB Screening   Were you born outside of the [place]? No          Today's PHQ-2 Score:       7/24/2024     3:30 PM   PHQ-2 ( 1999 [employer])   Q1: Little interest or pleasure in doing things 0   Q2: Feeling down, depressed or hopeless 0   PHQ-2 Score 0         12/10/2024   Substance Use   Alcohol more than 3/day or more than 7/wk Not Applicable   Do you use any other substances recreationally? No        Social History     Tobacco Use    Smoking status: Never    Smokeless tobacco: Never   Vaping Use    Vaping status: Never Used   Substance Use Topics    Alcohol use: Yes    Drug use: Never          Mammogram Screening - Patient under 40 years of age: Routine Mammogram Screening not  "recommended.           12/10/2024   One time HIV Screening   Previous HIV test? I don't know          12/10/2024   STI Screening   New sexual partner(s) since last STI/HIV test? No        History of abnormal Pap smear: Hx of ASCUS, repeat pap today             12/10/2024   Contraception/Family Planning   Questions about contraception or family planning No           Reviewed and updated as needed this visit by Provider     Meds                Past Medical History:   Diagnosis Date    MRSA infection      Past Surgical History:   Procedure Laterality Date    collarbone Left     three surgeries     OB History    Para Term  AB Living   2 2 2 0 0 2   SAB IAB Ectopic Multiple Live Births   0 0 0 0 2      # Outcome Date GA Lbr Vince/2nd Weight Sex Type Anes PTL Lv   2 Term 06/10/17 38w5d  3.033 kg (6 lb 11 oz) F Vag-Spont  N DILCIA      Name: Clara      Apgar1: 8  Apgar5: 9   1 Term 11 39w0d  3.09 kg (6 lb 13 oz) M Vag-Spont  N DILCIA      Birth Comments: No complications.   Baby born healthy.      Name: David         Review of Systems  Constitutional, HEENT, cardiovascular, pulmonary, GI, , musculoskeletal, neuro, skin, endocrine and psych systems are negative, except as otherwise noted.     Objective    Exam  /64   Pulse 80   Temp 98.1  F (36.7  C) (Temporal)   Resp 14   Ht 1.676 m (5' 6\")   Wt 63.5 kg (140 lb)   SpO2 100%   BMI 22.60 kg/m     Estimated body mass index is 22.6 kg/m  as calculated from the following:    Height as of this encounter: 1.676 m (5' 6\").    Weight as of this encounter: 63.5 kg (140 lb).    Physical Exam  GENERAL: alert and no distress  EYES: Eyes grossly normal to inspection, PERRL and conjunctivae and sclerae normal  HENT: ear canals and TM's normal, nose and mouth without ulcers or lesions  NECK: no adenopathy, no asymmetry, masses, or scars  RESP: lungs clear to auscultation - no rales, rhonchi or wheezes  CV: regular rate and rhythm, normal S1 S2, no S3 or S4, no " murmur, click or rub, no peripheral edema, DP pulses intact  ABDOMEN: soft, nontender, no hepatosplenomegaly, no masses and bowel sounds normal  MS: no gross musculoskeletal defects noted, no edema  SKIN: no suspicious lesions or rashes  NEURO: Normal strength and tone lower extremity sensation intact,, mentation intact and speech normal  PSYCH: mentation appears normal, affect normal/bright        Signed Electronically by: Pablo Craig MD

## 2024-12-11 LAB
HPV HR 12 DNA CVX QL NAA+PROBE: NEGATIVE
HPV16 DNA CVX QL NAA+PROBE: NEGATIVE
HPV18 DNA CVX QL NAA+PROBE: NEGATIVE
HUMAN PAPILLOMA VIRUS FINAL DIAGNOSIS: NORMAL

## 2024-12-16 PROBLEM — N87.0 DYSPLASIA OF CERVIX, LOW GRADE (CIN 1): Status: ACTIVE | Noted: 2021-02-10

## 2024-12-18 ENCOUNTER — OFFICE VISIT (OUTPATIENT)
Dept: FAMILY MEDICINE | Facility: CLINIC | Age: 34
End: 2024-12-18

## 2024-12-18 VITALS
HEIGHT: 66 IN | OXYGEN SATURATION: 100 % | TEMPERATURE: 97.7 F | DIASTOLIC BLOOD PRESSURE: 74 MMHG | WEIGHT: 142 LBS | SYSTOLIC BLOOD PRESSURE: 112 MMHG | HEART RATE: 80 BPM | BODY MASS INDEX: 22.82 KG/M2

## 2024-12-18 DIAGNOSIS — R94.4 DECREASED GFR: ICD-10-CM

## 2024-12-18 DIAGNOSIS — Z86.14 HISTORY OF MRSA INFECTION: ICD-10-CM

## 2024-12-18 DIAGNOSIS — L03.90 RECURRENT CELLULITIS: ICD-10-CM

## 2024-12-18 DIAGNOSIS — R79.89 ELEVATED SERUM CREATININE: ICD-10-CM

## 2024-12-18 DIAGNOSIS — N61.0 CELLULITIS OF LEFT BREAST: Primary | ICD-10-CM

## 2024-12-18 LAB
ANION GAP SERPL CALCULATED.3IONS-SCNC: 11 MMOL/L (ref 7–15)
BUN SERPL-MCNC: 13.9 MG/DL (ref 6–20)
CALCIUM SERPL-MCNC: 9.4 MG/DL (ref 8.8–10.4)
CHLORIDE SERPL-SCNC: 105 MMOL/L (ref 98–107)
CREAT SERPL-MCNC: 0.73 MG/DL (ref 0.51–0.95)
EGFRCR SERPLBLD CKD-EPI 2021: >90 ML/MIN/1.73M2
GLUCOSE SERPL-MCNC: 88 MG/DL (ref 70–99)
HCO3 SERPL-SCNC: 24 MMOL/L (ref 22–29)
POTASSIUM SERPL-SCNC: 4.1 MMOL/L (ref 3.4–5.3)
SODIUM SERPL-SCNC: 140 MMOL/L (ref 135–145)

## 2024-12-18 PROCEDURE — 87070 CULTURE OTHR SPECIMN AEROBIC: CPT | Performed by: STUDENT IN AN ORGANIZED HEALTH CARE EDUCATION/TRAINING PROGRAM

## 2024-12-18 PROCEDURE — 80048 BASIC METABOLIC PNL TOTAL CA: CPT | Performed by: STUDENT IN AN ORGANIZED HEALTH CARE EDUCATION/TRAINING PROGRAM

## 2024-12-18 PROCEDURE — 87205 SMEAR GRAM STAIN: CPT | Performed by: STUDENT IN AN ORGANIZED HEALTH CARE EDUCATION/TRAINING PROGRAM

## 2024-12-18 PROCEDURE — 99214 OFFICE O/P EST MOD 30 MIN: CPT | Performed by: STUDENT IN AN ORGANIZED HEALTH CARE EDUCATION/TRAINING PROGRAM

## 2024-12-18 PROCEDURE — 36415 COLL VENOUS BLD VENIPUNCTURE: CPT | Performed by: STUDENT IN AN ORGANIZED HEALTH CARE EDUCATION/TRAINING PROGRAM

## 2024-12-18 RX ORDER — DOXYCYCLINE 100 MG/1
100 TABLET ORAL 2 TIMES DAILY
Qty: 28 TABLET | Refills: 0 | Status: SHIPPED | OUTPATIENT
Start: 2024-12-18 | End: 2025-01-01

## 2024-12-18 ASSESSMENT — PAIN SCALES - GENERAL: PAINLEVEL_OUTOF10: EXTREME PAIN (8)

## 2024-12-18 NOTE — PROGRESS NOTES
Assessment & Plan     Cellulitis of left breast  Recurrent cellulitis  History of MRSA infection  Suspected recurrence or prior infection not resolved with Bactrim course. New culture obtained, but discussed consideration for use of alternative antibiotic given noted kidney functional effects on post-antibiotic labs. Opting for 14 day course of doxycycline BID as this was another agent indicated by prior susceptibility results. Patient should plan to follow-up PRN if worsens despite antibiotics, does not resolve, or if infection resolves and returns after completion of current treatment course. Return precautions reviewed.   - Body fluid, unsp Aerobic Bacterial Culture Routine With Gram Stain  - doxycycline monohydrate (ADOXA) 100 MG tablet; Take 1 tablet (100 mg) by mouth 2 times daily for 14 days.    Elevated serum creatinine  Decreased GFR  Repeat ordered by PCP. Reviewed results and noted they have returned to wnl. Suspect may have been affected by course of antibiotics prescribed for cellulitis 12/3.     Bianca HuttonDO Bautista   Libra is a 34 year old, presenting for the following health issues:  MRSA      12/18/2024     1:13 PM   Additional Questions   Roomed by Xiao TRACY     History of Present Illness       Reason for visit:  MRSA Infection returned. Repeat blood test for kidney function.    She eats 2-3 servings of fruits and vegetables daily.She consumes 1 sweetened beverage(s) daily.She exercises with enough effort to increase her heart rate 60 or more minutes per day.  She exercises with enough effort to increase her heart rate 5 days per week.   She is taking medications regularly.       Initial problem prompted ER visit. Culture showed MRSA.  First rx was for 7 days and it it cleared up 90%, but then she came back for a few more days to extend to 10 days.  This seemed to clear it up. Started developing again last Thursday with gradual worsening and eventually became what it is today, which is  "exactly as it was when she went to the ER.   Had noted increased Cr at follow-up visit 12/10 when received extended antibiotics. Recommend rpt lab to verify if resolved or persistent. PCP Dr. Khan ordered this at last visit. I will watch for the result and comment and also use to guide antibiotic choice.   Pus per wound on left breast in area of nipple ring. Brownish/red discharge prior to the pus, shifted to pus 3 days ago.   Hs been keeping non-stick pad with triple antibiotic ointment on it.   Feels had some chills yesterday and today. No confirmed fever.   Appetite was affected prior to the initial instance and has continued through now. Able to keep food and fluids down.   No tylenol or ibuprofen.    Review of Systems  Negative unless otherwise specified per HPI.        Objective    /74   Pulse 80   Temp 97.7  F (36.5  C) (Temporal)   Ht 1.676 m (5' 6\")   Wt 64.4 kg (142 lb)   SpO2 100%   BMI 22.92 kg/m    Body mass index is 22.92 kg/m .  Physical Exam   GENERAL: alert and no acute distress  EYES: Eyes grossly normal to inspection, PERRL and conjunctivae and sclerae normal  HENT: nose and mouth without ulcers or lesions  RESP: normal rate and effort  CV: regular rate, no peripheral edema  ABDOMEN: soft, nontender, non-distended  MS: no gross musculoskeletal defects noted, no edema  SKIN: L breast with swelling, ttp, and erythema in region of nipple and nipple piercing and minimal purulent drainage primarily from lateral side of nipple. No streaking. New wound culture collected, but prior culture available with sensitivities from 12/3.  NEURO: Normal strength and tone, mentation intact and speech normal  PSYCH: mentation appears normal, affect normal/bright        Signed Electronically by: Bianca Hutton, DO    "

## 2024-12-19 LAB
BACTERIA FLD CULT: ABNORMAL
GRAM STAIN RESULT: ABNORMAL
GRAM STAIN RESULT: ABNORMAL

## 2024-12-25 LAB
BACTERIA FLD CULT: ABNORMAL
BACTERIA FLD CULT: ABNORMAL
GRAM STAIN RESULT: ABNORMAL
GRAM STAIN RESULT: ABNORMAL

## 2025-01-15 ENCOUNTER — OFFICE VISIT (OUTPATIENT)
Dept: FAMILY MEDICINE | Facility: OTHER | Age: 35
End: 2025-01-15
Payer: COMMERCIAL

## 2025-01-15 VITALS
TEMPERATURE: 97.6 F | DIASTOLIC BLOOD PRESSURE: 73 MMHG | WEIGHT: 149 LBS | OXYGEN SATURATION: 99 % | HEART RATE: 84 BPM | BODY MASS INDEX: 24.05 KG/M2 | RESPIRATION RATE: 16 BRPM | SYSTOLIC BLOOD PRESSURE: 116 MMHG

## 2025-01-15 DIAGNOSIS — N64.52 NIPPLE DISCHARGE IN FEMALE: Primary | ICD-10-CM

## 2025-01-15 PROCEDURE — 99213 OFFICE O/P EST LOW 20 MIN: CPT | Performed by: FAMILY MEDICINE

## 2025-01-15 PROCEDURE — 87205 SMEAR GRAM STAIN: CPT | Performed by: FAMILY MEDICINE

## 2025-01-15 PROCEDURE — 87070 CULTURE OTHR SPECIMN AEROBIC: CPT | Performed by: FAMILY MEDICINE

## 2025-01-15 RX ORDER — SULFAMETHOXAZOLE AND TRIMETHOPRIM 800; 160 MG/1; MG/1
1 TABLET ORAL 2 TIMES DAILY
Qty: 14 TABLET | Refills: 0 | Status: SHIPPED | OUTPATIENT
Start: 2025-01-15 | End: 2025-01-22

## 2025-01-15 ASSESSMENT — PAIN SCALES - GENERAL: PAINLEVEL_OUTOF10: SEVERE PAIN (7)

## 2025-01-15 NOTE — PROGRESS NOTES
Assessment & Plan         ICD-10-CM    1. Nipple discharge in female  N64.52 Skin Aerobic Bacterial Culture Routine With Gram Stain     sulfamethoxazole-trimethoprim (BACTRIM DS) 800-160 MG tablet          Has been improved after each antibiotic round, but recurs shortly afterwards despite using antibiotics appropriate for culture. Likely the piercing itself is infected. Discussed that we can culture and treat again, but should remove piercing and sterilize. Once area is completely clear, can attempt putting it back in. May consider piercing placeholder in meantime as well. No use of the topicals as they appears to be contributing to some of the irritation.    I spent a total of 17 minutes on the day of the visit.   Time spent by me today doing chart review, history and exam, documentation and further activities per the note    Sade Dai MD         Lily Smyth is a 34 year old, presenting for the following health issues:  Breast Problem        1/15/2025     7:16 AM   Additional Questions   Roomed by christopher   Accompanied by self     History of Present Illness       Reason for visit:  Infection in breast    She eats 0-1 servings of fruits and vegetables daily.She consumes 1 sweetened beverage(s) daily.She exercises with enough effort to increase her heart rate 60 or more minutes per day.  She exercises with enough effort to increase her heart rate 5 days per week.   She is taking medications regularly.         Breast Concern  Onset/Duration: 8 weeks  Description:   Location: upper inner quadrant  Pain or tenderness: YES  Redness: No redness but bleedness  Intensity: severe  Progression of Symptoms: worsening  Accompanying Signs & Symptoms:  Any lumps in axillary region: No  Movable: No  Nipple discharge: bloody or green  Changes in the skin or nipple: none  On Hormone therapy: No  Does it change with menstrual cycle: No  Previous history of similar problem: no  First degree relative with breast cancer: a  negative family history for breast cancer.  Precipitating factors:           Worsened by: Clothing   Alleviating factors:            Improved by: heat   Therapies tried and outcome: Doxycycline(positive outcome) ,Bactroban(some relief), cephalexin (no)  No LMP recorded. (Menstrual status: IUD).        Review of Systems  Constitutional, HEENT, cardiovascular, pulmonary, GI, , musculoskeletal, neuro, skin, endocrine and psych systems are negative, except as otherwise noted.      Objective    /73   Pulse 84   Temp 97.6  F (36.4  C) (Temporal)   Resp 16   Wt 67.6 kg (149 lb)   SpO2 99%   BMI 24.05 kg/m    Body mass index is 24.05 kg/m .  Physical Exam   GENERAL: alert and no distress  BREAST: normal without masses, tenderness or nipple discharge and no palpable axillary masses or adenopathy  SKIN: at nipple piercing on the left there is crusted serosanginous drainage noted from both ends of the piercing. Small fluctuant swelling noted surrounding both ends.  PSYCH: mentation appears normal, affect normal/bright            Signed Electronically by: Sade Dai MD, MD

## 2025-01-16 LAB
BACTERIA SKIN AEROBE CULT: NORMAL
GRAM STAIN RESULT: NORMAL
GRAM STAIN RESULT: NORMAL

## 2025-01-19 LAB
BACTERIA SKIN AEROBE CULT: ABNORMAL
GRAM STAIN RESULT: ABNORMAL
GRAM STAIN RESULT: ABNORMAL

## 2025-06-05 ENCOUNTER — VIRTUAL VISIT (OUTPATIENT)
Dept: URGENT CARE | Facility: CLINIC | Age: 35
End: 2025-06-05
Payer: COMMERCIAL

## 2025-06-05 DIAGNOSIS — A69.20 ERYTHEMA MIGRANS (LYME DISEASE): Primary | ICD-10-CM

## 2025-06-05 RX ORDER — DOXYCYCLINE HYCLATE 100 MG
100 TABLET ORAL 2 TIMES DAILY
Qty: 28 TABLET | Refills: 0 | Status: SHIPPED | OUTPATIENT
Start: 2025-06-05 | End: 2025-06-19

## 2025-06-05 NOTE — PROGRESS NOTES
Libra is a 35 year old who is being evaluated via a billable video visit.          Assessment & Plan     Erythema migrans (Lyme disease)  Classic bull's-eye rash on the right side of her back.  Is uncertain what kind of tick it was but it was engorged.  Will treat with doxycycline for 2 weeks and have her follow-up with her primary care provider at the end of treatment.  Did discuss red flag symptoms and when to be seen sooner.    - doxycycline hyclate (VIBRA-TABS) 100 MG tablet; Take 1 tablet (100 mg) by mouth 2 times daily for 14 days.            Follow-up  Return in about 1 week (around 6/12/2025), or if symptoms worsen or fail to improve, for Follow up in 2 weeks with your provider.    Subjective   Libra is a 35 year old, presenting for the following health issues:  No chief complaint on file.    HPI      Tick Bite         Date of Bite: Found tick on her back on June 2, 2025 does not know how long the tick was attached but it was engorged.  Does not know if it was a deer tick or a wood tick.  [place].         Location: Right side of upper back         Was tick removed entirely? Yes         Symptoms since bite?  Fatigue and achiness         Have you noticed a red ring? Yes        Review of Systems  Constitutional, HEENT, cardiovascular, pulmonary, gi and gu systems are negative, except as otherwise noted.      Objective           Vitals:  No vitals were obtained today due to virtual visit.    Physical Exam   GENERAL: alert and no distress  SKIN: Bull's-eye rash right upper back with black spot in the center where tick had been attached  PSYCH: Appropriate affect, tone, and pace of words          Video-Visit Details    Type of service:  Video Visit   Originating Location (pt. Location): Home    Distant Location (provider location):  Off-site  Platform used for Video Visit: Nneka  Signed Electronically by: AUNG PEREZ VIRTUAL CARE PROVIDER 2

## 2025-07-07 ENCOUNTER — TELEPHONE (OUTPATIENT)
Dept: DERMATOLOGY | Facility: CLINIC | Age: 35
End: 2025-07-07
Payer: COMMERCIAL

## 2025-07-07 NOTE — PROGRESS NOTES
[facility] Dermatology Note  Encounter Date: Jul 10, 2025  Office Visit     Reviewed patients past medical history and pertinent chart review prior to patients visit today.     Dermatology Problem List:  Last skin check: 07/10/25       0. NUB right lower back, left flank shave biopsy 07/10/25 .     Inflamed skin tag  -cryo 7/10/2025      Personal Hx: no personal history of skin cancer  Family Hx: Family history of skin cancer- Mother, Melanoma   _________________________________________    Assessment & Plan:     # Neoplasm of uncertain behavior:  right lower back  DDx includes DN vs MM. Shave biopsy today.  # Neoplasm of uncertain behavior:  left flank DDx includes DN vs MM. Shave biopsy today.    Procedure Note: Biopsy by shave technique  The risks and benefits of the procedure were described to the patient. These include but are not limited to bleeding, infection, scar, incomplete removal, and non-diagnostic biopsy. Verbal informed consent was obtained. The above site(s) was cleansed with an alcohol pad and injected with 1% lidocaine with epinephrine. Once anesthesia was obtained, a biopsy(ies) was performed with Gilette blade. The tissue(s) was placed in a labeled container(s) with formalin and sent to pathology. Hemostasis was achieved with aluminum chloride. Vaseline and a bandage were applied to the wound(s). The patient tolerated the procedure well and was given post biopsy care instructions.     # Inflamed acrochordons x 1  The benign nature of the skin lesion(s) was discussed with the patient.  Due to the inflamed and irritated nature of the lesions I recommend cryotherapy and the patient was agreeable.  Care reviewed.    Cryotherapy procedure note, location(s): left axilla x 1.  After verbal consent and discussion of risks and benefits including, but not limited to, dyspigmentation/scar, blister, and pain, the lesion(s) was(were) treated with 1-2 mm freeze border for 1-2 cycles with  liquid nitrogen. Post cryotherapy instructions were provided.     # Benign skin findings including: lentigines and benign nevi.   - No further intervention required. Patient to report changes.   - Patient reassured of the benign nature of these lesions.    #Signs and Symptoms of non-melanoma skin cancer and ABCDEs of melanoma reviewed with patient. Patient encouraged to perform monthly self skin exams and educated on how to perform them. UV precautions reviewed with patient. Patient was asked about new or changing moles/lesions on body.     #Reviewed Sunscreen: Apply 20 minutes prior to going outdoors and reapply every two hours, when wet or sweating. We recommend using an SPF 30 or higher, and to use one that is water resistant.       Follow-up:  1 year(s) for follow up full body skin exam, prn for new or changing lesions or new concerns    Nevaeh May PA-C  [facility]  Dermatology     ____________________________________________    CC: Skin Tags (Left armpit, catches it when she shaves. It is not is not bleeding, but bothersome. ) and Skin Check (FBSC- no area of concern. Family history of MM. )    HPI:  Ms. Libra Guillen is a(n) 35 year old female who presents today as a new patient for a full body skin cancer screening. Patient has concerns today about a skin tag in the left axilla. This lesion can be very bothersome as it will catch on her razor. She is wondering about removal today. Patient reports being diligent with photoprotection.     Patient is otherwise feeling well, without additional skin concerns.     Physical Exam:  Vitals: There were no vitals taken for this visit.  SKIN: Total skin excluding the genitalia areas was performed. The exam included the head/face, neck, both arms, chest, back, abdomen, both legs, digits, mons pubis, buttock and nails.   -NUB, right lower back, dark brown to erythematous papule   -NUB, left flank, dark brown to erythematous papule   -Left axilla, flesh  colored, pedunculated papule(s)   -multiple tan/brown flat round macules and raised papules scattered throughout trunk, extremities and head. No worrisome features for malignancy noted on examination.  -scattered tan, homogenous macules scattered on sun exposed areas of trunk, extremities and face.           - No other lesions of concern on areas examined.     Medications:  Current Outpatient Medications   Medication Sig Dispense Refill    levonorgestrel (KYLEENA) 19.5 MG IUD 1 Device by Intrauterine route       No current facility-administered medications for this visit.      Past Medical History:   Patient Active Problem List   Diagnosis    IUD (intrauterine device) in place    Dysplasia of cervix, low grade (JASON 1)     Past Medical History:   Diagnosis Date    MRSA infection        CC Referred Self, MD  No address on file on close of this encounter.

## 2025-07-07 NOTE — TELEPHONE ENCOUNTER
Online Appointment Request     [facility] Call Center  Phone Message      Reason for Call: Appointment Intake     Patients Requests:  Patient Details  Patient's Full Name  Libra Guillen  Patient's Date of Birth  1990  Patient's Phone Number  [phone]  Patient's Email ID  [email or web address]  [facility]?  Yes          Action taken: Other: none; Documenting patient was called; detail message was left for patient to call back.

## 2025-07-10 ENCOUNTER — OFFICE VISIT (OUTPATIENT)
Dept: DERMATOLOGY | Facility: CLINIC | Age: 35
End: 2025-07-10
Payer: COMMERCIAL

## 2025-07-10 DIAGNOSIS — Z80.8 FAMILY HISTORY OF MELANOMA: ICD-10-CM

## 2025-07-10 DIAGNOSIS — D48.5 NEOPLASM OF UNCERTAIN BEHAVIOR OF SKIN: ICD-10-CM

## 2025-07-10 DIAGNOSIS — D22.9 MULTIPLE BENIGN NEVI: ICD-10-CM

## 2025-07-10 DIAGNOSIS — Z12.83 SCREENING EXAM FOR SKIN CANCER: Primary | ICD-10-CM

## 2025-07-10 DIAGNOSIS — L91.8 INFLAMED ACROCHORDON: ICD-10-CM

## 2025-07-10 ASSESSMENT — PAIN SCALES - GENERAL: PAINLEVEL_OUTOF10: NO PAIN (0)

## 2025-07-10 NOTE — LETTER
7/10/2025      Libra Guillen  [id number] [facility] [address]      Dear Colleague,    Thank you for referring your patient, Libra Guillen, to the [facility]. Please see a copy of my visit note below.    [facility] Dermatology Note  Encounter Date: Jul 10, 2025  Office Visit     Reviewed patients past medical history and pertinent chart review prior to patients visit today.     Dermatology Problem List:  Last skin check: 07/10/25       0. NUB right lower back, left flank shave biopsy 07/10/25 .     Inflamed skin tag  -cryo 7/10/2025      Personal Hx: no personal history of skin cancer  Family Hx: Family history of skin cancer- Mother, Melanoma   _________________________________________    Assessment & Plan:     # Neoplasm of uncertain behavior:  right lower back  DDx includes DN vs MM. Shave biopsy today.  # Neoplasm of uncertain behavior:  left flank DDx includes DN vs MM. Shave biopsy today.    Procedure Note: Biopsy by shave technique  The risks and benefits of the procedure were described to the patient. These include but are not limited to bleeding, infection, scar, incomplete removal, and non-diagnostic biopsy. Verbal informed consent was obtained. The above site(s) was cleansed with an alcohol pad and injected with 1% lidocaine with epinephrine. Once anesthesia was obtained, a biopsy(ies) was performed with Gilette blade. The tissue(s) was placed in a labeled container(s) with formalin and sent to pathology. Hemostasis was achieved with aluminum chloride. Vaseline and a bandage were applied to the wound(s). The patient tolerated the procedure well and was given post biopsy care instructions.     # Inflamed acrochordons x 1  The benign nature of the skin lesion(s) was discussed with the patient.  Due to the inflamed and irritated nature of the lesions I recommend cryotherapy and the patient was agreeable.  Care reviewed.    Cryotherapy procedure note,  location(s): left axilla x 1.  After verbal consent and discussion of risks and benefits including, but not limited to, dyspigmentation/scar, blister, and pain, the lesion(s) was(were) treated with 1-2 mm freeze border for 1-2 cycles with liquid nitrogen. Post cryotherapy instructions were provided.     # Benign skin findings including: lentigines and benign nevi.   - No further intervention required. Patient to report changes.   - Patient reassured of the benign nature of these lesions.    #Signs and Symptoms of non-melanoma skin cancer and ABCDEs of melanoma reviewed with patient. Patient encouraged to perform monthly self skin exams and educated on how to perform them. UV precautions reviewed with patient. Patient was asked about new or changing moles/lesions on body.     #Reviewed Sunscreen: Apply 20 minutes prior to going outdoors and reapply every two hours, when wet or sweating. We recommend using an SPF 30 or higher, and to use one that is water resistant.       Follow-up:  1 year(s) for follow up full body skin exam, prn for new or changing lesions or new concerns    Nevaeh May PA-C  [facility]  Dermatology     ____________________________________________    CC: Skin Tags (Left armpit, catches it when she shaves. It is not is not bleeding, but bothersome. ) and Skin Check (FBSC- no area of concern. Family history of MM. )    HPI:  Ms. Libra Guillen is a(n) 35 year old female who presents today as a new patient for a full body skin cancer screening. Patient has concerns today about a skin tag in the left axilla. This lesion can be very bothersome as it will catch on her razor. She is wondering about removal today. Patient reports being diligent with photoprotection.     Patient is otherwise feeling well, without additional skin concerns.     Physical Exam:  Vitals: There were no vitals taken for this visit.  SKIN: Total skin excluding the genitalia areas was performed. The exam included the  head/face, neck, both arms, chest, back, abdomen, both legs, digits, mons pubis, buttock and nails.   -NUB, right lower back, dark brown to erythematous papule   -NUB, left flank, dark brown to erythematous papule   -Left axilla, flesh colored, pedunculated papule(s)   -multiple tan/brown flat round macules and raised papules scattered throughout trunk, extremities and head. No worrisome features for malignancy noted on examination.  -scattered tan, homogenous macules scattered on sun exposed areas of trunk, extremities and face.           - No other lesions of concern on areas examined.     Medications:  Current Outpatient Medications   Medication Sig Dispense Refill     levonorgestrel (KYLEENA) 19.5 MG IUD 1 Device by Intrauterine route       No current facility-administered medications for this visit.      Past Medical History:   Patient Active Problem List   Diagnosis     IUD (intrauterine device) in place     Dysplasia of cervix, low grade (JASON 1)     Past Medical History:   Diagnosis Date     MRSA infection        CC Referred Self, MD  No address on file on close of this encounter.    Again, thank you for allowing me to participate in the care of your patient.        Sincerely,        Nevaeh May PA-C    Electronically signed

## 2025-07-10 NOTE — PATIENT INSTRUCTIONS
Wound Care After a Biopsy    What is a skin biopsy?  A skin biopsy allows the doctor to examine a very small piece of tissue under the microscope to determine the diagnosis and the best treatment for the skin condition. A local anesthetic (numbing medicine) is injected with a very small needle into the skin area to be tested. A small piece of skin is taken from the area. Sometimes a suture (stitch) is used.     What are the risks of a skin biopsy?  I will experience scar, bleeding, swelling, pain, crusting and redness. I may experience incomplete removal or recurrence. Risks of this procedure are excessive bleeding, bruising, infection, nerve damage, numbness, thick (hypertrophic or keloidal) scar and non-diagnostic biopsy.    How should I care for my wound for the first 24 hours?  Keep the wound dry and covered for 24 hours  If it bleeds, hold direct pressure on the area for 15 minutes. If bleeding does not stop, call us or go to the emergency room  Avoid strenuous exercise the first 1-2 days or as your doctor instructs you    How should I care for the wound after 24 hours?  After 24 hours, remove the bandage  You may bathe or shower as normal  If you had a scalp biopsy, you can shampoo as usual and can use shower water to clean the biopsy site daily  Clean the wound once a day with gentle soap and water  Do not scrub, be gentle  Apply white petroleum/Vaseline after cleaning the wound with a cotton swab or a clean finger, and keep the site covered with a Bandaid /bandage. Bandages are not necessary with a scalp biopsy  If you are unable to cover the site with a Bandaid /bandage, re-apply ointment 2-3 times a day to keep the site moist. Moisture will help with healing  Avoid strenuous activity for first 1-2 days  Avoid lakes, rivers, pools, and oceans until the stitches are removed or the site is healed    How do I clean my wound?  Wash hands thoroughly with soap or use hand  before all wound care  Clean  the wound with gentle soap and water  Apply white petroleum/Vaseline  to wound after it is clean  Replace the Bandaid /bandage to keep the wound covered for the first few days or as instructed by your doctor  If you had a scalp biopsy, warm shower water to the area on a daily basis should suffice    What should I use to clean my wound?   Cotton-tipped applicators (Qtips )  White petroleum jelly (Vaseline ). Use a clean new container and use Q-tips to apply.  Bandaids  as needed  Gentle soap     How should I care for my wound long term?  Do not get your wound dirty  Keep up with wound care for one week or until the area is healed.  A small scab will form and fall off by itself when the area is completely healed. The area will be red and will become pink in color as it heals. Sun protection is very important for how your scar will turn out. Sunscreen with an SPF 30 or greater is recommended once the area is healed.  You should have some soreness but it should be mild and slowly go away over several days. Talk to your doctor about using tylenol for pain,    When should I call my doctor?  If you have increased:   Pain or swelling  Pus or drainage (clear or slightly yellow drainage is ok)  Temperature over 100F  Spreading redness or warmth around wound    When will I hear about my results?  The biopsy results can take 2 weeks to come back.  Your results will automatically release to [employer] before your provider has even reviewed them.  The clinic will call you with the results, send you a [employer] message, or have you schedule a follow-up clinic or phone time to discuss the results.  Contact our clinics if you do not hear from us in 2 weeks.    Who should I call with questions?  [facility]: [phone]  [facility]: [phone]  For urgent needs outside of business hours call the [facility] at [phone] and ask for the dermatology resident on call      Cryotherapy    What is it?  Use of a very cold liquid, such as liquid nitrogen, to freeze and destroy abnormal skin cells that need to be removed    What should I expect?  Tenderness and redness  A small blister that might grow and fill with dark purple blood. There may be crusting.  More than one treatment may be needed if the lesions do not go away.    How do I care for the treated area?  Gently wash the area with your hands when bathing.  Use a thin layer of Vaseline to help with healing. You may use a Band-Aid.   The area should heal within 7-10 days and may leave behind a pink or lighter color.   Do not use an antibiotic or Neosporin ointment.   You may take acetaminophen (Tylenol) for pain.     Call your doctor if you have:  Severe pain  Signs of infection (warmth, redness, cloudy yellow drainage, and or a bad smell)  Questions or concerns    Who should I call with questions?      [facility]: [phone]      [facility]: [phone]      For urgent needs outside of business hours call the [facility] at [phone] and ask for the dermatology resident on call            The ABCDEs of Melanoma    Skin cancer can develop anywhere on the skin. Ask someone for help when checking your skin, especially in hard to see places. If you notice a mole different from others, or that changes, enlarges, itches, or bleeds (even if it is small), you should see a dermatologist.

## 2025-07-14 LAB
PATH REPORT.COMMENTS IMP SPEC: NORMAL
PATH REPORT.COMMENTS IMP SPEC: NORMAL
PATH REPORT.FINAL DX SPEC: NORMAL
PATH REPORT.GROSS SPEC: NORMAL
PATH REPORT.MICROSCOPIC SPEC OTHER STN: NORMAL
PATH REPORT.RELEVANT HX SPEC: NORMAL

## 2025-07-16 ENCOUNTER — RESULTS FOLLOW-UP (OUTPATIENT)
Dept: DERMATOLOGY | Facility: CLINIC | Age: 35
End: 2025-07-16
Payer: COMMERCIAL

## 2025-08-13 ENCOUNTER — APPOINTMENT (OUTPATIENT)
Dept: GENERAL RADIOLOGY | Facility: CLINIC | Age: 35
End: 2025-08-13
Attending: EMERGENCY MEDICINE
Payer: COMMERCIAL

## 2025-08-13 ENCOUNTER — HOSPITAL ENCOUNTER (EMERGENCY)
Facility: CLINIC | Age: 35
Discharge: HOME | End: 2025-08-13
Attending: EMERGENCY MEDICINE
Payer: COMMERCIAL

## 2025-08-13 VITALS
HEART RATE: 87 BPM | SYSTOLIC BLOOD PRESSURE: 130 MMHG | DIASTOLIC BLOOD PRESSURE: 79 MMHG | HEIGHT: 66 IN | OXYGEN SATURATION: 100 % | WEIGHT: 150 LBS | RESPIRATION RATE: 16 BRPM | BODY MASS INDEX: 24.11 KG/M2 | TEMPERATURE: 98.4 F

## 2025-08-13 DIAGNOSIS — S99.921A TOE INJURY, RIGHT, INITIAL ENCOUNTER: Primary | ICD-10-CM

## 2025-08-13 PROCEDURE — 99283 EMERGENCY DEPT VISIT LOW MDM: CPT | Mod: 25 | Performed by: EMERGENCY MEDICINE

## 2025-08-13 PROCEDURE — 73660 X-RAY EXAM OF TOE(S): CPT | Mod: RT

## 2025-08-13 PROCEDURE — 90471 IMMUNIZATION ADMIN: CPT | Performed by: EMERGENCY MEDICINE

## 2025-08-13 PROCEDURE — 99283 EMERGENCY DEPT VISIT LOW MDM: CPT | Performed by: EMERGENCY MEDICINE

## 2025-08-13 PROCEDURE — 90715 TDAP VACCINE 7 YRS/> IM: CPT | Performed by: EMERGENCY MEDICINE

## 2025-08-13 PROCEDURE — 250N000011 HC RX IP 250 OP 636: Performed by: EMERGENCY MEDICINE

## 2025-08-13 PROCEDURE — 250N000013 HC RX MED GY IP 250 OP 250 PS 637: Performed by: EMERGENCY MEDICINE

## 2025-08-13 RX ORDER — IBUPROFEN 600 MG/1
600 TABLET, FILM COATED ORAL ONCE
Status: COMPLETED | OUTPATIENT
Start: 2025-08-13 | End: 2025-08-13

## 2025-08-13 RX ORDER — SULFAMETHOXAZOLE AND TRIMETHOPRIM 800; 160 MG/1; MG/1
1 TABLET ORAL 2 TIMES DAILY
Qty: 10 TABLET | Refills: 0 | Status: SHIPPED | OUTPATIENT
Start: 2025-08-13 | End: 2025-08-18

## 2025-08-13 RX ADMIN — IBUPROFEN 600 MG: 600 TABLET ORAL at 09:55

## 2025-08-13 RX ADMIN — CLOSTRIDIUM TETANI TOXOID ANTIGEN (FORMALDEHYDE INACTIVATED), CORYNEBACTERIUM DIPHTHERIAE TOXOID ANTIGEN (FORMALDEHYDE INACTIVATED), BORDETELLA PERTUSSIS TOXOID ANTIGEN (GLUTARALDEHYDE INACTIVATED), BORDETELLA PERTUSSIS FILAMENTOUS HEMAGGLUTININ ANTIGEN (FORMALDEHYDE INACTIVATED), BORDETELLA PERTUSSIS PERTACTIN ANTIGEN, AND BORDETELLA PERTUSSIS FIMBRIAE 2/3 ANTIGEN 0.5 ML: 5; 2; 2.5; 5; 3; 5 INJECTION, SUSPENSION INTRAMUSCULAR at 09:47

## 2025-08-13 ASSESSMENT — ACTIVITIES OF DAILY LIVING (ADL)
ADLS_ACUITY_SCORE: 41
ADLS_ACUITY_SCORE: 41